# Patient Record
Sex: FEMALE | Race: WHITE | NOT HISPANIC OR LATINO | Employment: PART TIME | URBAN - METROPOLITAN AREA
[De-identification: names, ages, dates, MRNs, and addresses within clinical notes are randomized per-mention and may not be internally consistent; named-entity substitution may affect disease eponyms.]

---

## 2017-04-29 ENCOUNTER — GENERIC CONVERSION - ENCOUNTER (OUTPATIENT)
Dept: OTHER | Facility: OTHER | Age: 62
End: 2017-04-29

## 2017-08-12 ENCOUNTER — TRANSCRIBE ORDERS (OUTPATIENT)
Dept: ADMINISTRATIVE | Facility: HOSPITAL | Age: 62
End: 2017-08-12

## 2017-08-12 ENCOUNTER — APPOINTMENT (OUTPATIENT)
Dept: LAB | Facility: MEDICAL CENTER | Age: 62
End: 2017-08-12
Payer: COMMERCIAL

## 2017-08-12 DIAGNOSIS — Z00.8 HEALTH EXAMINATION IN POPULATION SURVEY: Primary | ICD-10-CM

## 2017-08-12 DIAGNOSIS — Z00.8 HEALTH EXAMINATION IN POPULATION SURVEY: ICD-10-CM

## 2017-08-12 LAB
CHOLEST SERPL-MCNC: 201 MG/DL (ref 50–200)
EST. AVERAGE GLUCOSE BLD GHB EST-MCNC: 103 MG/DL
HBA1C MFR BLD: 5.2 % (ref 4.2–6.3)
HDLC SERPL-MCNC: 49 MG/DL (ref 40–60)
LDLC SERPL CALC-MCNC: 121 MG/DL (ref 0–100)
TRIGL SERPL-MCNC: 156 MG/DL

## 2017-08-12 PROCEDURE — 36415 COLL VENOUS BLD VENIPUNCTURE: CPT

## 2017-08-12 PROCEDURE — 83036 HEMOGLOBIN GLYCOSYLATED A1C: CPT

## 2017-08-12 PROCEDURE — 80061 LIPID PANEL: CPT

## 2018-01-15 NOTE — PROGRESS NOTES
Assessment    1  Seasonal allergies (477 9) (J30 2)    Plan  Seasonal allergies    · Fluticasone Propionate 50 MCG/ACT Nasal Suspension (Flonase Allergy Relief);  USE 1 SPRAY IN EACH NOSTRIL ONCE DAILY  Not to be used  for longer than one week   · Follow-up PRN Evaluation and Treatment  Follow-up  Status: Complete  Done:  29Apr2017   · Avoid exposure to cigarette smoke ; Status:Complete;   Done: [id number]   · Avoid exposure to things that make your problem worse ; Status:Complete;   Done:  56YKR4022   · Drink at least 6 glasses of water or juice a day ; Status:Complete;   Done: 50PMF1143   · Good hand washing is one of the best ways to control the spread of germs ;  Status:Complete;   Done: 75Xka2168   · How to use a nasal spray:; Status:Complete;   Done: 29Apr2017   · Taking a hot steamy shower may help your condition ; Status:Complete;   Done:  95TYU3760   · Use a cool mist humidifier in the room ; Status:Complete;   Done: 13CPU0879    Discussion/Summary    I have explained to the patient that her symptoms are consistent w/ seasonal allergies which are causing PND which is resulting in the dry cough and hoarse voice  I have explained to her that based on her current clinical presentation I do not think an antibiotic is warranted  I have advised drinking plenty of fluids, taking Tylenol or Motrin as needed, warm salt water gargles, using Flonase nasal spray as directed, throat lozenges, and taking an OTC anti-allergy medication as needed  I did offer a Rx for Tessalon pearls to help with the cough which patient declined because she felt it did not work for her in the past  If symptoms persist despite treatment, I recommended that she should be seen by PCP for re-check, or may present to one of our walk-in centers  Patient verbalized understanding  Possible side effects of new medications were reviewed with the patient/guardian today  The treatment plan was reviewed with the patient/guardian   The patient/guardian understands and agrees with the treatment plan     Follow Up with your Primary Care Provider in 5-7 days  If your symptoms worsen follow up at the nearest [place] [address] Emergency Room  If your symptoms worsen follow up at the nearest Emergency Room  Chief Complaint  cold symptoms      History of Present Illness  56 yo female c/o dry cough, post-nasal drip, hoarse voice, and nasal congestion x 6 days  No fever/chills  No sinus pain/pressure  No chest pain, SOB, or wheezing  Non-smoker  Has been taking Robitussin DM and Advil as needed  Has a hx of Hypertrophic cardiomyopathy, is on Metoprolol, and Hyperlipidemia, for which she is on Pravastatin  Denies any known medication allergies  Patient stating she thinks she needs an antibiotic  Review of Systems    Constitutional: No fever, no chills, feels well, no tiredness, no recent weight gain or loss  ENT: as noted in HPI  Cardiovascular: no complaints of slow or fast heart rate, no chest pain, no palpitations, no leg claudication or lower extremity edema, no chest pain and no palpitations  Respiratory: cough, but as noted in HPI, no shortness of breath and no wheezing  Gastrointestinal: no complaints of abdominal pain, no constipation, no nausea or diarrhea, no vomiting, no bloody stools  Musculoskeletal: no complaints of arthralgia, no myalgia, no joint swelling or stiffness, no limb pain or swelling  Integumentary: no complaints of skin rash or lesion, no itching or dry skin, no skin wounds  Neurological: no complaints of headache, no confusion, no numbness or tingling, no dizziness or fainting  Active Problems    1  Seasonal allergies (477 9) (J30 2)    Past Medical History    The active problems and past medical history were reviewed and updated today  Family History    The family history was reviewed and updated today  Social History  The social history was reviewed and updated today        Surgical History    The surgical history was reviewed and updated today  Current Meds    The medication list was reviewed and updated today  Allergies    1   No Known Drug Allergies    Observations Made  Patient is AAOx3 in NAD      Signatures   Electronically signed by : BALJIT Mayfield ; Apr 29 2017  7:18PM EST                       (Author)

## 2018-02-12 ENCOUNTER — TRANSCRIBE ORDERS (OUTPATIENT)
Dept: ADMINISTRATIVE | Facility: HOSPITAL | Age: 63
End: 2018-02-12

## 2018-02-12 ENCOUNTER — APPOINTMENT (OUTPATIENT)
Dept: RADIOLOGY | Facility: MEDICAL CENTER | Age: 63
End: 2018-02-12
Payer: COMMERCIAL

## 2018-02-12 DIAGNOSIS — M79.672 LEFT FOOT PAIN: ICD-10-CM

## 2018-02-12 DIAGNOSIS — M79.672 LEFT FOOT PAIN: Primary | ICD-10-CM

## 2018-02-12 PROCEDURE — 73630 X-RAY EXAM OF FOOT: CPT

## 2019-04-20 ENCOUNTER — OFFICE VISIT (OUTPATIENT)
Dept: URGENT CARE | Facility: CLINIC | Age: 64
End: 2019-04-20
Payer: COMMERCIAL

## 2019-04-20 VITALS
TEMPERATURE: 100.2 F | OXYGEN SATURATION: 95 % | BODY MASS INDEX: 22.28 KG/M2 | HEIGHT: 68 IN | HEART RATE: 87 BPM | RESPIRATION RATE: 18 BRPM | SYSTOLIC BLOOD PRESSURE: 123 MMHG | WEIGHT: 147 LBS | DIASTOLIC BLOOD PRESSURE: 70 MMHG

## 2019-04-20 DIAGNOSIS — J20.9 ACUTE BRONCHITIS, UNSPECIFIED ORGANISM: Primary | ICD-10-CM

## 2019-04-20 PROCEDURE — 99283 EMERGENCY DEPT VISIT LOW MDM: CPT | Performed by: NURSE PRACTITIONER

## 2019-04-20 PROCEDURE — G0382 LEV 3 HOSP TYPE B ED VISIT: HCPCS | Performed by: NURSE PRACTITIONER

## 2019-04-20 RX ORDER — ALBUTEROL SULFATE 90 UG/1
2 AEROSOL, METERED RESPIRATORY (INHALATION) EVERY 6 HOURS PRN
Qty: 18 G | Refills: 0 | Status: SHIPPED | OUTPATIENT
Start: 2019-04-20

## 2019-04-20 RX ORDER — PRAVASTATIN SODIUM 20 MG
20 TABLET ORAL
COMMUNITY
Start: 2018-03-22

## 2019-04-20 RX ORDER — METOPROLOL SUCCINATE 25 MG/1
12.5 TABLET, EXTENDED RELEASE ORAL
COMMUNITY
Start: 2018-02-26

## 2019-04-20 RX ORDER — DEXTROMETHORPHAN HYDROBROMIDE AND PROMETHAZINE HYDROCHLORIDE 15; 6.25 MG/5ML; MG/5ML
5 SYRUP ORAL 4 TIMES DAILY PRN
Qty: 118 ML | Refills: 0 | Status: SHIPPED | OUTPATIENT
Start: 2019-04-20

## 2019-04-20 RX ORDER — AZITHROMYCIN 250 MG/1
TABLET, FILM COATED ORAL
Qty: 6 TABLET | Refills: 0 | Status: SHIPPED | OUTPATIENT
Start: 2019-04-20 | End: 2019-04-24

## 2020-12-23 ENCOUNTER — IMMUNIZATIONS (OUTPATIENT)
Dept: FAMILY MEDICINE CLINIC | Facility: HOSPITAL | Age: 65
End: 2020-12-23
Payer: COMMERCIAL

## 2020-12-23 DIAGNOSIS — Z23 ENCOUNTER FOR IMMUNIZATION: ICD-10-CM

## 2020-12-23 PROCEDURE — 91301 SARS-COV-2 / COVID-19 MRNA VACCINE (MODERNA) 100 MCG: CPT

## 2020-12-23 PROCEDURE — 0011A SARS-COV-2 / COVID-19 MRNA VACCINE (MODERNA) 100 MCG: CPT

## 2021-01-19 ENCOUNTER — IMMUNIZATIONS (OUTPATIENT)
Dept: FAMILY MEDICINE CLINIC | Facility: HOSPITAL | Age: 66
End: 2021-01-19

## 2021-01-19 DIAGNOSIS — Z23 ENCOUNTER FOR IMMUNIZATION: Primary | ICD-10-CM

## 2021-01-19 PROCEDURE — 0012A SARS-COV-2 / COVID-19 MRNA VACCINE (MODERNA) 100 MCG: CPT

## 2021-01-19 PROCEDURE — 91301 SARS-COV-2 / COVID-19 MRNA VACCINE (MODERNA) 100 MCG: CPT

## 2021-07-19 ENCOUNTER — OFFICE VISIT (OUTPATIENT)
Dept: DERMATOLOGY | Facility: CLINIC | Age: 66
End: 2021-07-19
Payer: COMMERCIAL

## 2021-07-19 VITALS — TEMPERATURE: 97.8 F | WEIGHT: 134 LBS | HEIGHT: 68 IN | BODY MASS INDEX: 20.31 KG/M2

## 2021-07-19 DIAGNOSIS — L56.8 ACTINIC CHEILITIS: Primary | ICD-10-CM

## 2021-07-19 DIAGNOSIS — L82.1 SEBORRHEIC KERATOSIS: ICD-10-CM

## 2021-07-19 PROCEDURE — 99204 OFFICE O/P NEW MOD 45 MIN: CPT | Performed by: DERMATOLOGY

## 2021-07-19 RX ORDER — FLUOROURACIL 50 MG/G
CREAM TOPICAL
Qty: 40 G | Refills: 1 | Status: SHIPPED | OUTPATIENT
Start: 2021-07-19

## 2021-07-19 NOTE — PATIENT INSTRUCTIONS
1  ACTINIC KERATOSIS    Physical Exam:   Anatomic Location Affected:  Left lower lip    Assessment and Plan:  Based on a thorough discussion of this condition and the management approach to it (including a comprehensive discussion of the known risks, side effects and potential benefits of treatment), the patient (family) agrees to implement the following specific plan:   Begin fluorouracil cream twice a day for 2 weeks in the fall   Follow up 1 month after treatment       2  SEBORRHEIC KERATOSIS; NON-INFLAMED     Physical Exam:  · Anatomic Location Affected:  Trunk, right waistline and extremities       Assessment and Plan:  Based on a thorough discussion of this condition and the management approach to it (including a comprehensive discussion of the known risks, side effects and potential benefits of treatment), the patient (family) agrees to implement the following specific plan:  · Reassure benign  · Use sun protection  Apply SPF 30 or higher at least three times a day  Wear sun protecting clothing and hats    · $150 to treat with LN2 up to 10 lesions if not bothersome

## 2021-07-19 NOTE — PROGRESS NOTES
[facility] Note     Patient Name: Chun Emerson  Encounter Date: 07/19/2021     Have you been cared for by a [facility]ologist in the last 3 years and, if so, which one? No    · Have you traveled outside of the [address] [place] [address] in the past 3 months or outside of the [facility] area in the last 2 weeks? No     May we call your Preferred Phone number to discuss your specific medical information? Yes     May we leave a detailed message that includes your specific medical information? Yes      Today's Chief Concerns:   Concern #1:  Lesion on lip   Concern #2:  Lesion left shoulder, back, left flank and right hip    Past Medical History:  Have you personally ever had or currently have any of the following? · Skin cancer (such as Melanoma, Basal Cell Carcinoma, Squamous Cell Carcinoma? (If Yes, please provide more detail)- YES, BCC right lower eyelid about 5 years ago  · Eczema: No  · Psoriasis: No  · HIV/AIDS: No  · Hepatitis B or C: No  · Tuberculosis: No  · Systemic Immunosuppression such as Diabetes, Biologic or Immunotherapy, Chemotherapy, Organ Transplantation, Bone Marrow Transplantation (If YES, please provide more detail): No  · Radiation Treatment (If YES, please provide more detail): No  · Any other major medical conditions/concerns? 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 Family History:  Have any of your "first degree relatives" (parent, brother, sister, or child) had any of the following       · Skin cancer such as Melanoma or Merkel Cell Carcinoma or Pancreatic Cancer? YES, Parents both had BCCs  · Eczema, Asthma, Hay Fever or Seasonal Allergies: YES, daughter had asthma as a child, daughter and mother had seasonal allergies  · Psoriasis or Psoriatic Arthritis: No  · Do any other medical conditions seem to run in your family?   If Yes, what condition and which relatives? No    Current Medications:   (please update all dermatological medications before printing patient's AVS!)      Current Outpatient Medications:     Coenzyme Q10 400 MG CAPS, Take by mouth, Disp: , Rfl:     metoprolol succinate (TOPROL-XL) 25 mg 24 hr tablet, Take 12 5 mg by mouth, Disp: , Rfl:     pravastatin (PRAVACHOL) 20 mg tablet, Take 20 mg by mouth, Disp: , Rfl:     albuterol (VENTOLIN HFA) 90 mcg/act inhaler, Inhale 2 puffs every 6 (six) hours as needed for wheezing (Patient not taking: Reported on 7/19/2021), Disp: 18 g, Rfl: 0    ASPIRIN 81 PO, Take 81 mg by mouth (Patient not taking: Reported on 7/19/2021), Disp: , Rfl:     promethazine-dextromethorphan (PHENERGAN-DM) 6 25-15 mg/5 mL oral syrup, Take 5 mL by mouth 4 (four) times a day as needed for cough (Patient not taking: Reported on 7/19/2021), Disp: 118 mL, Rfl: 0      Review of Systems:  Have you recently had or currently have any of the following? If YES, what are you doing for the problem? · Fever, chills or unintended weight loss: No  · Sudden loss or change in your vision: No  · Nausea, vomiting or blood in your stool: No  · Painful or swollen joints: No  · Wheezing or cough: No  · Changing mole or non-healing wound: No  · Nosebleeds: No  · Excessive sweating: No  · Easy or prolonged bleeding? No  · Over the last 2 weeks, how often have you been bothered by the following problems? · Taking little interest or pleasure in doing things: 1 - Not at All  · Feeling down, depressed, or hopeless: 1 - Not at All  · Rapid heartbeat with epinephrine:  No    · FEMALES ONLY:    · Are you pregnant or planning to become pregnant? No  · Are you currently or planning to be nursing or breast feeding? No    · Any known allergies?       · No Known Allergies      Physical Exam:     Was a chaperone (Derm Clinical Assistant) present throughout the entire Physical Exam? Yes     Did the Dermatology Team specifically  the patient on the importance of a Full Skin Exam to be sure that nothing is missed clinically? Yes}  o Did the patient ultimately request or accept a Full Skin Exam?  NO  o Did the patient specifically refuse to have the areas "under-the-bra" examined by the Dermatologist? Penelope   o Did the patient specifically refuse to have the areas "under-the-underwear" examined by the Dermatologist? YES    CONSTITUTIONAL:   Vitals:    07/19/21 1242   Temp: 97 8 °F (36 6 °C)   TempSrc: Tympanic   Weight: 60 8 kg (134 lb)   Height: 5' 8" (1 727 m)       PSYCH: Normal mood and affect  EYES: Normal conjunctiva  ENT: Normal lips and oral mucosa  CARDIOVASCULAR: No edema  RESPIRATORY: Normal respirations  HEME/LYMPH/IMMUNO:  No regional lymphadenopathy except as noted below in "ASSESSMENT AND PLAN BY DIAGNOSIS"    SKIN:  FULL ORGAN SYSTEM EXAM    Face Normal except as noted below in Assessment                       Back/Spine Normal except as noted below in Assessment       Right Leg Normal except as noted below in Assessment            Assessment and Plan by Diagnosis:    History of Present Condition:     Duration:  How long has this been an issue for you?    o  1 year   Location Affected:  Where on the body is this affecting you?    o  Lower lip   Quality:  Is there any bleeding, pain, itch, burning/irritation, or redness associated with the skin lesion?    o  Dry and flaky   Severity:  Describe any bleeding, pain, itch, burning/irritation, or redness on a scale of 1 to 10 (with 10 being the worst)  o  5   Timing:  Does this condition seem to be there pretty constantly or do you notice it more at specific times throughout the day?    o  Constantly   Context:  Have you ever noticed that this condition seems to be associated with specific activities you do?    o  Denies   Modifying Factors:    o Anything that seems to make the condition worse?    -  Denies  o What have you tried to do to make the condition better? -  Denies       1  ACTINIC cheilitis    Physical Exam:   Anatomic Location Affected:  Left lower lip   Morphological Description:  Diffuse thin pink papules and plaques extends on the cutaneous lip      Assessment and Plan:  Based on a thorough discussion of this condition and the management approach to it (including a comprehensive discussion of the known risks, side effects and potential benefits of treatment), the patient (family) agrees to implement the following specific plan:   Begin fluorouracil cream twice a day for 2 weeks in the fall   Follow up 1 month after treatment  EFUDEX INSTRUCTIONS    Efudex (5-flurouracil) is a medicine that treats the pre-cancers    1  Apply a thin layer of the medicine with your fingertip and massage it in well  Afterward, wash your finger thoroughly with water  a  Can also use gloves  2  Moisturize with Vaseline or Aquaphor after the course is complete   3  Avoid harsh soaps  Use dove unscented  4  Use ice packs for up 15 min at a time to help with itch, pain, redness  5  Do not sunbathe while using 5-FU  The sun tends to intensify your skin's reaction to this medication  You want to avoid this  6  The treated areas need to be completely protected from the sun during the treatment and the recovery process  If you need to go out into the sun, cover the area with a hat, long sleeve shirts, gloves, etc   7  Once your skin is completely healed, we recommend using a sunscreen every day with an SPF of 30 or higher (rain or shine) to prevent further skin damage  8  You may use makeup  What to Expect During Therapy   Redness is caused by inflammation and destruction of the abnormal cells and indicates your lesions are responding to the treatment  It takes 2-4 weeks for the redness to fade after stopping the medicine; most of the redness resolves over the first 1-2 weeks   Crusting and peeling occurs as the sun-damaged skin is removed to allow for replacement by normal skin      If having extensive burning, itching, swelling or tenderness call the Dermatology Department to arrange an appointment  Although the medicine sometimes produces dramatic redness, crusting and peeling, problems such as allergic reactions and infection are rare  General Information   This is a chemotherapy medication when taken by mouth; however, when applied topically, the medication is not absorbed into the bloodstream and does not cause typical chemotherapy side effects  It is safe to your body   The medicine works by destroying the pre-cancer skin cells  As a result, it causes redness, irritation, and scabbing  This is a normal part of therapeutic skin response  2  SEBORRHEIC KERATOSIS; NON-INFLAMED     Physical Exam:  · Anatomic Location Affected:  Trunk, right waistline and extremities  · Morphological Description:  Waxy, smooth to warty textured, yellow to brownish-grey to dark brown to blackish, discrete, "stuck-on" appearing papules  · Present for years  Denies pain, itch, bleeding  Additional History of Present Condition:  Present constantly; no modifying factors which make it worse or better  No prior treatment  Assessment and Plan:  Based on a thorough discussion of this condition and the management approach to it (including a comprehensive discussion of the known risks, side effects and potential benefits of treatment), the patient (family) agrees to implement the following specific plan:  · Reassure benign  · Use sun protection  Apply SPF 30 or higher at least three times a day  Wear sun protecting clothing and hats    · $150 to treat with LN2 up to 10 lesions if not bothersome        Scribe Attestation    I,:  Sara Swanson MA am acting as a scribe while in the presence of the attending physician :       I,:  Kait Bajwa MD personally performed the services described in this documentation    as scribed in my presence :

## 2021-10-08 ENCOUNTER — NURSE TRIAGE (OUTPATIENT)
Dept: OTHER | Facility: OTHER | Age: 66
End: 2021-10-08

## 2021-10-08 DIAGNOSIS — Z11.59 SPECIAL SCREENING EXAMINATION FOR VIRAL DISEASE: Primary | ICD-10-CM

## 2021-10-08 PROCEDURE — U0005 INFEC AGEN DETEC AMPLI PROBE: HCPCS | Performed by: FAMILY MEDICINE

## 2021-10-08 PROCEDURE — U0003 INFECTIOUS AGENT DETECTION BY NUCLEIC ACID (DNA OR RNA); SEVERE ACUTE RESPIRATORY SYNDROME CORONAVIRUS 2 (SARS-COV-2) (CORONAVIRUS DISEASE [COVID-19]), AMPLIFIED PROBE TECHNIQUE, MAKING USE OF HIGH THROUGHPUT TECHNOLOGIES AS DESCRIBED BY CMS-2020-01-R: HCPCS | Performed by: FAMILY MEDICINE

## 2021-11-12 ENCOUNTER — IMMUNIZATIONS (OUTPATIENT)
Dept: FAMILY MEDICINE CLINIC | Facility: HOSPITAL | Age: 66
End: 2021-11-12

## 2021-11-12 DIAGNOSIS — Z23 ENCOUNTER FOR IMMUNIZATION: Primary | ICD-10-CM

## 2021-11-12 PROCEDURE — 91306 COVID-19 MODERNA VACC 0.25 ML BOOSTER: CPT

## 2021-11-12 PROCEDURE — 0013A COVID-19 MODERNA VACC 0.25 ML BOOSTER: CPT

## 2021-11-18 ENCOUNTER — OFFICE VISIT (OUTPATIENT)
Dept: DERMATOLOGY | Facility: CLINIC | Age: 66
End: 2021-11-18
Payer: COMMERCIAL

## 2021-11-18 VITALS — HEIGHT: 68 IN | TEMPERATURE: 97.6 F | WEIGHT: 136.1 LBS | BODY MASS INDEX: 20.63 KG/M2

## 2021-11-18 DIAGNOSIS — L24.4 IRRITANT CONTACT DERMATITIS DUE TO DRUG IN CONTACT WITH SKIN: ICD-10-CM

## 2021-11-18 DIAGNOSIS — L82.1 SEBORRHEIC KERATOSIS: ICD-10-CM

## 2021-11-18 DIAGNOSIS — L56.8 ACTINIC CHEILITIS: Primary | ICD-10-CM

## 2021-11-18 PROCEDURE — 99213 OFFICE O/P EST LOW 20 MIN: CPT | Performed by: DERMATOLOGY

## 2022-09-20 DIAGNOSIS — M79.672 FOOT PAIN, BILATERAL: Primary | ICD-10-CM

## 2022-09-20 DIAGNOSIS — M79.671 FOOT PAIN, BILATERAL: Primary | ICD-10-CM

## 2022-09-20 RX ORDER — LIDOCAINE 4 G/G
1 PATCH TOPICAL ONCE
Qty: 5 PATCH | Refills: 2 | Status: SHIPPED | OUTPATIENT
Start: 2022-09-20 | End: 2022-09-20

## 2023-09-14 ENCOUNTER — HOSPITAL ENCOUNTER (OUTPATIENT)
Dept: CARDIOLOGY | Facility: HOSPITAL | Age: 68
Discharge: HOME | End: 2023-09-14
Attending: ORTHOPAEDIC SURGERY
Payer: COMMERCIAL

## 2023-09-14 ENCOUNTER — APPOINTMENT (OUTPATIENT)
Dept: LAB | Facility: HOSPITAL | Age: 68
End: 2023-09-14
Attending: ORTHOPAEDIC SURGERY
Payer: COMMERCIAL

## 2023-09-14 ENCOUNTER — OFFICE VISIT (OUTPATIENT)
Dept: PREADMISSION TESTING | Facility: HOSPITAL | Age: 68
End: 2023-09-14
Attending: ORTHOPAEDIC SURGERY
Payer: COMMERCIAL

## 2023-09-14 ENCOUNTER — TRANSCRIBE ORDERS (OUTPATIENT)
Dept: REGISTRATION | Facility: HOSPITAL | Age: 68
End: 2023-09-14

## 2023-09-14 VITALS
DIASTOLIC BLOOD PRESSURE: 81 MMHG | HEIGHT: 68 IN | RESPIRATION RATE: 20 BRPM | HEART RATE: 104 BPM | WEIGHT: 122.4 LBS | SYSTOLIC BLOOD PRESSURE: 136 MMHG | TEMPERATURE: 97.5 F | OXYGEN SATURATION: 100 % | BODY MASS INDEX: 18.55 KG/M2

## 2023-09-14 DIAGNOSIS — M16.11 UNILATERAL PRIMARY OSTEOARTHRITIS, RIGHT HIP: ICD-10-CM

## 2023-09-14 DIAGNOSIS — I48.0 PAROXYSMAL ATRIAL FIBRILLATION (CMS/HCC): ICD-10-CM

## 2023-09-14 DIAGNOSIS — I42.2 HYPERTROPHIC CARDIOMYOPATHY (CMS/HCC): ICD-10-CM

## 2023-09-14 DIAGNOSIS — M16.11 UNILATERAL PRIMARY OSTEOARTHRITIS, RIGHT HIP: Primary | ICD-10-CM

## 2023-09-14 DIAGNOSIS — Z01.818 PREOP TESTING: Primary | ICD-10-CM

## 2023-09-14 DIAGNOSIS — M16.11 OSTEOARTHRITIS OF RIGHT HIP, UNSPECIFIED OSTEOARTHRITIS TYPE: ICD-10-CM

## 2023-09-14 LAB
ABO + RH BLD: NORMAL
ALBUMIN SERPL-MCNC: 4.6 G/DL (ref 3.5–5.7)
ALP SERPL-CCNC: 89 IU/L (ref 34–125)
ALT SERPL-CCNC: 21 IU/L (ref 7–52)
ANION GAP SERPL CALC-SCNC: 7 MEQ/L (ref 3–15)
APTT PPP: 32 SEC (ref 23–35)
AST SERPL-CCNC: 17 IU/L (ref 13–39)
BASOPHILS # BLD: 0.03 K/UL (ref 0.01–0.1)
BASOPHILS NFR BLD: 0.6 %
BILIRUB SERPL-MCNC: 0.8 MG/DL (ref 0.3–1.2)
BLD GP AB SCN SERPL QL: NEGATIVE
BUN SERPL-MCNC: 13 MG/DL (ref 7–25)
CALCIUM SERPL-MCNC: 9.5 MG/DL (ref 8.6–10.3)
CHLORIDE SERPL-SCNC: 107 MEQ/L (ref 98–107)
CO2 SERPL-SCNC: 28 MEQ/L (ref 21–31)
CREAT SERPL-MCNC: 0.8 MG/DL (ref 0.6–1.2)
D AG BLD QL: POSITIVE
DIFFERENTIAL METHOD BLD: ABNORMAL
EOSINOPHIL # BLD: 0.06 K/UL (ref 0.04–0.36)
EOSINOPHIL NFR BLD: 1.1 %
ERYTHROCYTE [DISTWIDTH] IN BLOOD BY AUTOMATED COUNT: 14 % (ref 11.7–14.4)
EST. AVERAGE GLUCOSE BLD GHB EST-MCNC: 117 MG/DL
GFR SERPL CREATININE-BSD FRML MDRD: >60 ML/MIN/1.73M*2
GLUCOSE SERPL-MCNC: 92 MG/DL (ref 70–99)
HBA1C MFR BLD: 5.7 %
HCT VFR BLDCO AUTO: 41.7 % (ref 35–45)
HGB BLD-MCNC: 13.4 G/DL (ref 11.8–15.7)
IMM GRANULOCYTES # BLD AUTO: 0.01 K/UL (ref 0–0.08)
IMM GRANULOCYTES NFR BLD AUTO: 0.2 %
INR PPP: 1.2
LABORATORY COMMENT REPORT: NORMAL
LYMPHOCYTES # BLD: 1.52 K/UL (ref 1.2–3.5)
LYMPHOCYTES NFR BLD: 29 %
MCH RBC QN AUTO: 30.5 PG (ref 28–33.2)
MCHC RBC AUTO-ENTMCNC: 32.1 G/DL (ref 32.2–35.5)
MCV RBC AUTO: 95 FL (ref 83–98)
MONOCYTES # BLD: 0.41 K/UL (ref 0.28–0.8)
MONOCYTES NFR BLD: 7.8 %
NEUTROPHILS # BLD: 3.22 K/UL (ref 1.7–7)
NEUTS SEG NFR BLD: 61.3 %
NRBC BLD-RTO: 0 %
PDW BLD AUTO: 9.7 FL (ref 9.4–12.3)
PLATELET # BLD AUTO: 190 K/UL (ref 150–369)
POTASSIUM SERPL-SCNC: 4.5 MEQ/L (ref 3.5–5.1)
PROT SERPL-MCNC: 6.6 G/DL (ref 6–8.2)
PROTHROMBIN TIME: 14.7 SEC (ref 12.2–14.5)
QRS DURATION: 84
QT INTERVAL: 352
QTC CALCULATION(BAZETT): 476
R AXIS: 73
RBC # BLD AUTO: 4.39 M/UL (ref 3.93–5.22)
SODIUM SERPL-SCNC: 142 MEQ/L (ref 136–145)
SPECIMEN EXP DATE BLD: NORMAL
T WAVE AXIS: 253
VENTRICULAR RATE: 110
WBC # BLD AUTO: 5.25 K/UL (ref 3.8–10.5)

## 2023-09-14 PROCEDURE — 3008F BODY MASS INDEX DOCD: CPT | Performed by: HOSPITALIST

## 2023-09-14 PROCEDURE — 99205 OFFICE O/P NEW HI 60 MIN: CPT | Performed by: HOSPITALIST

## 2023-09-14 PROCEDURE — 80053 COMPREHEN METABOLIC PANEL: CPT

## 2023-09-14 PROCEDURE — 85730 THROMBOPLASTIN TIME PARTIAL: CPT

## 2023-09-14 PROCEDURE — 85610 PROTHROMBIN TIME: CPT

## 2023-09-14 PROCEDURE — 83036 HEMOGLOBIN GLYCOSYLATED A1C: CPT

## 2023-09-14 PROCEDURE — 86901 BLOOD TYPING SEROLOGIC RH(D): CPT

## 2023-09-14 PROCEDURE — 93005 ELECTROCARDIOGRAM TRACING: CPT

## 2023-09-14 PROCEDURE — 93010 ELECTROCARDIOGRAM REPORT: CPT | Performed by: INTERNAL MEDICINE

## 2023-09-14 PROCEDURE — 36415 COLL VENOUS BLD VENIPUNCTURE: CPT

## 2023-09-14 PROCEDURE — 85025 COMPLETE CBC W/AUTO DIFF WBC: CPT

## 2023-09-14 RX ORDER — ACETAMINOPHEN 500 MG
1000 TABLET ORAL EVERY 6 HOURS PRN
COMMUNITY

## 2023-09-14 RX ORDER — AMIODARONE HYDROCHLORIDE 200 MG/1
200 TABLET ORAL NIGHTLY
COMMUNITY
Start: 2023-08-31

## 2023-09-14 RX ORDER — APIXABAN 5 MG/1
5 TABLET, FILM COATED ORAL 2 TIMES DAILY
COMMUNITY
Start: 2023-08-08 | End: 2023-10-11 | Stop reason: HOSPADM

## 2023-09-14 RX ORDER — PRAVASTATIN SODIUM 20 MG/1
20 TABLET ORAL NIGHTLY
COMMUNITY
Start: 2023-09-05

## 2023-09-14 RX ORDER — METOPROLOL TARTRATE 25 MG/1
25 TABLET, FILM COATED ORAL 2 TIMES DAILY
COMMUNITY
Start: 2023-08-09

## 2023-09-14 ASSESSMENT — PAIN SCALES - GENERAL: PAINLEVEL: 2

## 2023-09-14 NOTE — ASSESSMENT & PLAN NOTE
History of HOCM with LVOT obstruction.  She reports this has improved with the use of mavacamten.  She follows closely with Dr. Lay, her cardiologist.   Ultimately defer preoperative cardiovascular risk assessment and recommendations for further testing to the patient's cardiologist. She will be seeing Dr. Lay on 10/2/23.   Recommend telemetry post op. Defer further management of afib and HOCM to the patient's cardiologist.  She will continue Mavacamten per her cardiologist recommendations.

## 2023-09-14 NOTE — CONSULTS
[facility] Medicine Service -  Pre-Operative Consultation       Patient Name: Karina Talavera  Referring Surgeon: Dr. Boeyr    Reason for Referral: Pre-Operative Evaluation  Surgical Procedure: R THR  Operative Date: 10/10/23    PCP: Becki Holt RN        HISTORY OF PRESENT ILLNESS      Karina Talavera is a 67 y.o. female presenting today to the [facility] at [place] for pre-operative evaluation prior to planned surgery.    Ms. Talavera has been dealing with pain and decreased ROM of her R hip due to DJD. The symptoms have progressed and are interfering with her quality of life so she has decided to proceed with joint replacement.    Overall, the patient has been feeling in good health. She denies current or recent CP, SOB, syncope, fever, cough, abd pain, vomiting, diarrhea, BRBPR, melena, hematemesis, dysuria, hematuria, headache, vision change, vertigo, numbness/tingling/focal weakness, light headedness, or additional complaint.       Functionally, the patient is able to ascend a flight of stairs with no dyspnea or chest pain, and can walk at least a city block.   She denies ever having any anginal symptoms.  Functional capacity is  > 4 METS.    The patient denies, on specific questioning, the following:  No history of MI, valvular heart disease or CHF.  No history of EMPERATRIZ.  No history of DVT/PE.  No history of COPD.  No history of CVA or TIA.  No history of DM or insulin use.   No history of CKD.   No history of liver disease or cirrhosis.  No history of bleeding disorder.  No history of difficult airway/difficult intubation.  No history of Malignant hyperthermia.  No history of adverse reaction to anesthesia in the past.      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, right  "       Past Surgical History:   Procedure Laterality Date    CARDIOVERSION  08/2023    DILATION AND CURETTAGE OF UTERUS         MEDICATIONS        Current Outpatient Medications:     acetaminophen (TYLENOL) 500 mg tablet, Take 1,000 mg by mouth every 6 (six) hours as needed., Disp: , Rfl:     glucosamine HCl/chondroitin lujan (GLUCOSAMINE-CHONDROITIN ORAL), Take 1 tablet by mouth 3 (three) times a day., Disp: , Rfl:     amiodarone (PACERONE) 200 mg tablet, Take 200 mg by mouth nightly., Disp: , Rfl:     CAMZYOS 10 mg capsule capsule, Take 10 mg by mouth nightly., Disp: , Rfl:     ELIQUIS 5 mg tablet, Take 5 mg by mouth 2 (two) times a day., Disp: , Rfl:     metoprolol tartrate (LOPRESSOR) 25 mg tablet, Take 25 mg by mouth 2 (two) times a day., Disp: , Rfl:     pravastatin (PRAVACHOL) 20 mg tablet, Take 20 mg by mouth nightly., Disp: , Rfl:     ALLERGIES      Patient has no known allergies.    FAMILY HISTORY        Denies pertinent family history    SOCIAL HISTORY      Social History     Tobacco Use    Smoking status: Never    Smokeless tobacco: Never   Vaping Use    Vaping Use: Never used   Substance Use Topics    Alcohol use: Yes     Comment: glass of wine 3-4 times per week    Drug use: Never       REVIEW OF SYSTEMS      All other systems reviewed and negative except as noted in HPI    PHYSICAL EXAMINATION      Visit Vitals  /81   Pulse (!) 104   Temp 36.4 °C (97.5 °F)   Resp 20   Ht 1.727 m (5' 8\")   Wt 55.5 kg (122 lb 6.4 oz)   SpO2 100%   BMI 18.61 kg/m²     Body mass index is 18.61 kg/m².    Physical Exam  General: nontoxic appearing, no acute distress  HEENT: Moist membranes, PERRL, anicteric sclera   Neck: supple, no JVD  Cardiac: RRR, +S1/S2, no murmur, no rub   Lungs: clear bilaterally, no wheezing/rales/rhonchi  Abdomen: soft, NT/ND, +BS, no rebound/guarding   Extremities: no edema, distal perfusion intact  MSK:  no joint effusion or erythema  Neuro: AAOx3, nonfocal.   Skin: no rash. " Clean, dry, intact.   Psych: cooperative    LABS / EKG        Labs  Today's labs reviewed.     Lab Results   Component Value Date     09/14/2023    K 4.5 09/14/2023     09/14/2023    BUN 13 09/14/2023    CREATININE 0.8 09/14/2023    WBC 5.25 09/14/2023    HGB 13.4 09/14/2023    HCT 41.7 09/14/2023     09/14/2023    ALT 21 09/14/2023    AST 17 09/14/2023    INR 1.2 09/14/2023    HGBA1C 5.7 (H) 09/14/2023         ECG   Reviewed. Atrial fibrillation 110 bpm. Nonspecific abnormality.    ASSESSMENT AND PLAN         Osteoarthritis of right hip  R THR planned for 10/10/23.  See below for statement in regards to perioperative risk.   Defer recommendations on NSAID use to the patient's surgeon.  Recommend DVT prophylaxis at the discretion of the surgeon.   Incentive spirometry and early ambulation post op.  Perioperative antibiotics defer to surgery.  Post op bowel regimen.  If present, recommend removal of ricci catheter as early as possible to prevent infection.  Monitor CBC, BMP, and volume status in the perioperative period.      Hypertrophic cardiomyopathy (CMS/HCC)  History of HOCM with LVOT obstruction.  She reports this has improved with the use of mavacamten.  She follows closely with Dr. Lay, her cardiologist.   Ultimately defer preoperative cardiovascular risk assessment and recommendations for further testing to the patient's cardiologist. She will be seeing Dr. Lay on 10/2/23.   Recommend telemetry post op. Defer further management of afib and HOCM to the patient's cardiologist.  She will continue Mavacamten per her cardiologist recommendations.    Paroxysmal atrial fibrillation (CMS/HCC)  The patient had a DCCM in August, but continues to have intermittent atrial fibrillation since then.  She states she does not have any significant symptoms but knows when she is in/out of atrial fibrillation.  Her cardiologist is aware of this and recently started amiodarone.  The patient will see  her cardiologist prior to the surgery for further recommendations.  She will continue metoprolol the morning of surgery.  Telemetry post op.  She will hold Eliquis 72 hours prior to surgery.   The patient is aware of the increased risk of thrombosis or stroke while her AC is on hold for surgery.  Recommend resuming Eliquis as soon as ok with the surgeon post op.         In regards to perioperative cardiac risk:  The patient denies any history of ischemic heart disease, denies any history of CHF, denies any history of CVA, is not on pre-operative treatment with insulin, and does not have a pre-operative creatinine > 2 mg/dL.   The Revised Cardiac Risk Index (RCRI) = 0 for this patient which indicates a 0.4% risk of major adverse cardiac event in the perioperative period for this intermediate risk procedure.   She does have a history of HCM with LVOT obstruction. Ultimately defer preoperative cardiovascular risk assessment and recommendations for further testing to the patient's cardiologist. She will be seeing Dr. Lay on 10/2/23. Recommend telemetry post op. Defer further management of afib and HOCM to the patient's cardiologist.      Further comments:  STOP-BANG screening for obstructive sleep apnea = 1, which indicates the patient is low risk for having undrlying EMPERATRIZ.          Please do not hesitate to contact [facility] during the upcoming hospitalization with any questions or concerns.     Jayesh Castro, DO  9/15/2023

## 2023-09-14 NOTE — ASSESSMENT & PLAN NOTE
R THR planned for 10/10/23.  See below for statement in regards to perioperative risk.   Defer recommendations on NSAID use to the patient's surgeon.  Recommend DVT prophylaxis at the discretion of the surgeon.   Incentive spirometry and early ambulation post op.  Perioperative antibiotics defer to surgery.  Post op bowel regimen.  If present, recommend removal of ricci catheter as early as possible to prevent infection.  Monitor CBC, BMP, and volume status in the perioperative period.

## 2023-09-14 NOTE — PRE-PROCEDURE INSTRUCTIONS
1. You will be called between 3pm-7pm one business day before your procedure to tell you where and when to report. If you do not receive a phone call by 7pm, please call the Admissions office at [phone].    2. Please report to Surgery Registration on the day of your procedure. You can park in the [facility], enter the front doors of the new Pavilion, and take the [facility] elevators to the second floor. Follow signs to Surgery Registration.       3. Please follow the following fasting guidelines:     No solid food EIGHT HOURS prior to arrival time on day of surgery.  Unlimited clear liquids, meaning water or PLAIN black coffee WITHOUT any milk, cream, sugar, or sweetener are permitted up to TWO HOURS prior to arrival at the hospital. Purchase a 20 oz bottle of Gatorade. Drink 10 oz the night before surgery and 10 oz two hours prior to arrival time to hospital.    4. Please take ONLY the following medications with a sip of water on the morning of your procedure:   metoprolol on am of surgery. Hold supplements for one week. Stop Eliquis 72 hours prior to surgery per Dr Castro.    5. Other Instructions: You may brush your teeth the morning of the procedure. Rinse and spit, do not swallow.  Bring a list of your medications with dosages.  Use surgical wash as directed.     6. If you develop a cold, cough, fever, rash, or other symptom prior to the day of the procedure, please report it to your physician immediately.    7. If you need to cancel the procedure for any reason, please contact your physician.    8. Make arrangements to have safe transportation home accompanied by a responsible adult. If you have not arranged safe transportation home, your surgery will be cancelled. Safe transportation may include private vehicle, ride-share service, taxi and public transportation when accompanied by a responsible adult who will assist you home. A responsible adult is someone known to you and does not include the taxi,  ride-share or public transit drive transporting you.    9.  If it is medically necessary for you to have a longer stay, you will be informed as soon as the decision is made.    10. Only bring essential items to the hospital.  Do not wear or bring anything of value to the hospital including jewelry of any kind, money, or wallet. Do not wear make-up or contact lenses.  DO NOT BRING MEDICATIONS FROM HOME unless instructed to do so. DO bring your hearing aids, glasses, and a case    11. No lotion, creams, powders, or oils on skin the morning of procedure     12. Dress in comfortable clothes.    13.  If instructed, please bring a copy of your Advanced Directive (Living Will/Durable Power of ) on the day of your procedure.     14. Ensuring your safety at all times is a very important part of our [facility] Culture of Safety. After having surgery and sedation, you are at risk for falling and balance issues. Although you may feel awake, the effects of the medication can last up to 24 hours after anesthesia. If you need to use the bathroom during your recovery period, nursing staff will escort you there and stay with you to ensure your safety.    15. Refrain from drinking alcohol and smoking cigarettes for 24 hours prior to surgery.    16. Shower with antibacterial soap (Dial) the night before and morning of your procedure.  If your procedure indicates the need for CHG antiseptic wash (Bactoshield or Hibiclens), please use this instead and follow instructions as discussed at the time of your Pre-Admission Testing phone interview or visit.    Above instructions reviewed with patient and patient acknowledges understanding.    Form explained by: Becki Pritchard RN

## 2023-09-14 NOTE — ASSESSMENT & PLAN NOTE
The patient had a DCCM in August, but continues to have intermittent atrial fibrillation since then.  She states she does not have any significant symptoms but knows when she is in/out of atrial fibrillation.  Her cardiologist is aware of this and recently started amiodarone.  The patient will see her cardiologist prior to the surgery for further recommendations.  She will continue metoprolol the morning of surgery.  Telemetry post op.  She will hold Eliquis 72 hours prior to surgery.   The patient is aware of the increased risk of thrombosis or stroke while her AC is on hold for surgery.  Recommend resuming Eliquis as soon as ok with the surgeon post op.

## 2023-09-14 NOTE — H&P (VIEW-ONLY)
[facility] Medicine Service -  Pre-Operative Consultation       Patient Name: Karina Talavera  Referring Surgeon: Dr. Boyer    Reason for Referral: Pre-Operative Evaluation  Surgical Procedure: R THR  Operative Date: 10/10/23    PCP: Becki Holt RN        HISTORY OF PRESENT ILLNESS      Karina Talavera is a 67 y.o. female presenting today to the [facility] at [place] for pre-operative evaluation prior to planned surgery.    Ms. Talavera has been dealing with pain and decreased ROM of her R hip due to DJD. The symptoms have progressed and are interfering with her quality of life so she has decided to proceed with joint replacement.    Overall, the patient has been feeling in good health. She denies current or recent CP, SOB, syncope, fever, cough, abd pain, vomiting, diarrhea, BRBPR, melena, hematemesis, dysuria, hematuria, headache, vision change, vertigo, numbness/tingling/focal weakness, light headedness, or additional complaint.       Functionally, the patient is able to ascend a flight of stairs with no dyspnea or chest pain, and can walk at least a city block.   She denies ever having any anginal symptoms.  Functional capacity is  > 4 METS.    The patient denies, on specific questioning, the following:  No history of MI, valvular heart disease or CHF.  No history of EMPERATRIZ.  No history of DVT/PE.  No history of COPD.  No history of CVA or TIA.  No history of DM or insulin use.   No history of CKD.   No history of liver disease or cirrhosis.  No history of bleeding disorder.  No history of difficult airway/difficult intubation.  No history of Malignant hyperthermia.  No history of adverse reaction to anesthesia in the past.      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, right  "       Past Surgical History:   Procedure Laterality Date    CARDIOVERSION  08/2023    DILATION AND CURETTAGE OF UTERUS         MEDICATIONS        Current Outpatient Medications:     acetaminophen (TYLENOL) 500 mg tablet, Take 1,000 mg by mouth every 6 (six) hours as needed., Disp: , Rfl:     glucosamine HCl/chondroitin lujan (GLUCOSAMINE-CHONDROITIN ORAL), Take 1 tablet by mouth 3 (three) times a day., Disp: , Rfl:     amiodarone (PACERONE) 200 mg tablet, Take 200 mg by mouth nightly., Disp: , Rfl:     CAMZYOS 10 mg capsule capsule, Take 10 mg by mouth nightly., Disp: , Rfl:     ELIQUIS 5 mg tablet, Take 5 mg by mouth 2 (two) times a day., Disp: , Rfl:     metoprolol tartrate (LOPRESSOR) 25 mg tablet, Take 25 mg by mouth 2 (two) times a day., Disp: , Rfl:     pravastatin (PRAVACHOL) 20 mg tablet, Take 20 mg by mouth nightly., Disp: , Rfl:     ALLERGIES      Patient has no known allergies.    FAMILY HISTORY        Denies pertinent family history    SOCIAL HISTORY      Social History     Tobacco Use    Smoking status: Never    Smokeless tobacco: Never   Vaping Use    Vaping Use: Never used   Substance Use Topics    Alcohol use: Yes     Comment: glass of wine 3-4 times per week    Drug use: Never       REVIEW OF SYSTEMS      All other systems reviewed and negative except as noted in HPI    PHYSICAL EXAMINATION      Visit Vitals  /81   Pulse (!) 104   Temp 36.4 °C (97.5 °F)   Resp 20   Ht 1.727 m (5' 8\")   Wt 55.5 kg (122 lb 6.4 oz)   SpO2 100%   BMI 18.61 kg/m²     Body mass index is 18.61 kg/m².    Physical Exam  General: nontoxic appearing, no acute distress  HEENT: Moist membranes, PERRL, anicteric sclera   Neck: supple, no JVD  Cardiac: RRR, +S1/S2, no murmur, no rub   Lungs: clear bilaterally, no wheezing/rales/rhonchi  Abdomen: soft, NT/ND, +BS, no rebound/guarding   Extremities: no edema, distal perfusion intact  MSK:  no joint effusion or erythema  Neuro: AAOx3, nonfocal.   Skin: no rash. " Clean, dry, intact.   Psych: cooperative    LABS / EKG        Labs  Today's labs reviewed.     Lab Results   Component Value Date     09/14/2023    K 4.5 09/14/2023     09/14/2023    BUN 13 09/14/2023    CREATININE 0.8 09/14/2023    WBC 5.25 09/14/2023    HGB 13.4 09/14/2023    HCT 41.7 09/14/2023     09/14/2023    ALT 21 09/14/2023    AST 17 09/14/2023    INR 1.2 09/14/2023    HGBA1C 5.7 (H) 09/14/2023         ECG   Reviewed. Atrial fibrillation 110 bpm. Nonspecific abnormality.    ASSESSMENT AND PLAN         Osteoarthritis of right hip  R THR planned for 10/10/23.  See below for statement in regards to perioperative risk.   Defer recommendations on NSAID use to the patient's surgeon.  Recommend DVT prophylaxis at the discretion of the surgeon.   Incentive spirometry and early ambulation post op.  Perioperative antibiotics defer to surgery.  Post op bowel regimen.  If present, recommend removal of ricci catheter as early as possible to prevent infection.  Monitor CBC, BMP, and volume status in the perioperative period.      Hypertrophic cardiomyopathy (CMS/HCC)  History of HOCM with LVOT obstruction.  She reports this has improved with the use of mavacamten.  She follows closely with Dr. Lay, her cardiologist.   Ultimately defer preoperative cardiovascular risk assessment and recommendations for further testing to the patient's cardiologist. She will be seeing Dr. Lay on 10/2/23.   Recommend telemetry post op. Defer further management of afib and HOCM to the patient's cardiologist.  She will continue Mavacamten per her cardiologist recommendations.    Paroxysmal atrial fibrillation (CMS/HCC)  The patient had a DCCM in August, but continues to have intermittent atrial fibrillation since then.  She states she does not have any significant symptoms but knows when she is in/out of atrial fibrillation.  Her cardiologist is aware of this and recently started amiodarone.  The patient will see  her cardiologist prior to the surgery for further recommendations.  She will continue metoprolol the morning of surgery.  Telemetry post op.  She will hold Eliquis 72 hours prior to surgery.   The patient is aware of the increased risk of thrombosis or stroke while her AC is on hold for surgery.  Recommend resuming Eliquis as soon as ok with the surgeon post op.         In regards to perioperative cardiac risk:  The patient denies any history of ischemic heart disease, denies any history of CHF, denies any history of CVA, is not on pre-operative treatment with insulin, and does not have a pre-operative creatinine > 2 mg/dL.   The Revised Cardiac Risk Index (RCRI) = 0 for this patient which indicates a 0.4% risk of major adverse cardiac event in the perioperative period for this intermediate risk procedure.   She does have a history of HCM with LVOT obstruction. Ultimately defer preoperative cardiovascular risk assessment and recommendations for further testing to the patient's cardiologist. She will be seeing Dr. Lay on 10/2/23. Recommend telemetry post op. Defer further management of afib and HOCM to the patient's cardiologist.      Further comments:  STOP-BANG screening for obstructive sleep apnea = 1, which indicates the patient is low risk for having undrlying EMPERATRIZ.          Please do not hesitate to contact [facility] during the upcoming hospitalization with any questions or concerns.     Jayesh Castro, DO  9/15/2023

## 2023-10-09 ENCOUNTER — ANESTHESIA EVENT (OUTPATIENT)
Dept: OPERATING ROOM | Facility: HOSPITAL | Age: 68
Setting detail: HOSPITAL OUTPATIENT SURGERY
End: 2023-10-09
Payer: COMMERCIAL

## 2023-10-10 ENCOUNTER — APPOINTMENT (OUTPATIENT)
Dept: RADIOLOGY | Facility: HOSPITAL | Age: 68
End: 2023-10-10
Attending: NURSE PRACTITIONER
Payer: COMMERCIAL

## 2023-10-10 ENCOUNTER — HOSPITAL ENCOUNTER (OUTPATIENT)
Facility: HOSPITAL | Age: 68
Discharge: HOME | End: 2023-10-11
Attending: ORTHOPAEDIC SURGERY | Admitting: ORTHOPAEDIC SURGERY
Payer: COMMERCIAL

## 2023-10-10 ENCOUNTER — ANESTHESIA (OUTPATIENT)
Dept: OPERATING ROOM | Facility: HOSPITAL | Age: 68
Setting detail: HOSPITAL OUTPATIENT SURGERY
End: 2023-10-10
Payer: COMMERCIAL

## 2023-10-10 PROBLEM — E78.5 DYSLIPIDEMIA: Status: ACTIVE | Noted: 2023-10-10

## 2023-10-10 PROBLEM — I48.91 ATRIAL FIBRILLATION (CMS/HCC): Status: ACTIVE | Noted: 2023-09-14

## 2023-10-10 LAB
ABO + RH BLD: NORMAL
BASOPHILS # BLD: 0.03 K/UL (ref 0.01–0.1)
BASOPHILS NFR BLD: 0.2 %
BLD GP AB SCN SERPL QL: NEGATIVE
D AG BLD QL: POSITIVE
DIFFERENTIAL METHOD BLD: ABNORMAL
EOSINOPHIL # BLD: 0.01 K/UL (ref 0.04–0.36)
EOSINOPHIL NFR BLD: 0.1 %
ERYTHROCYTE [DISTWIDTH] IN BLOOD BY AUTOMATED COUNT: 14.1 % (ref 11.7–14.4)
HCT VFR BLDCO AUTO: 28.5 % (ref 35–45)
HCT VFR BLDCO AUTO: 31 % (ref 35–45)
HGB BLD-MCNC: 9.3 G/DL (ref 11.8–15.7)
HGB BLD-MCNC: 9.8 G/DL (ref 11.8–15.7)
IMM GRANULOCYTES # BLD AUTO: 0.04 K/UL (ref 0–0.08)
IMM GRANULOCYTES NFR BLD AUTO: 0.3 %
INR PPP: 1.1
LABORATORY COMMENT REPORT: NORMAL
LYMPHOCYTES # BLD: 0.7 K/UL (ref 1.2–3.5)
LYMPHOCYTES NFR BLD: 5.5 %
MCH RBC QN AUTO: 30.7 PG (ref 28–33.2)
MCHC RBC AUTO-ENTMCNC: 31.6 G/DL (ref 32.2–35.5)
MCV RBC AUTO: 97.2 FL (ref 83–98)
MONOCYTES # BLD: 0.27 K/UL (ref 0.28–0.8)
MONOCYTES NFR BLD: 2.1 %
NEUTROPHILS # BLD: 11.65 K/UL (ref 1.7–7)
NEUTS SEG NFR BLD: 91.8 %
NRBC BLD-RTO: 0 %
PDW BLD AUTO: 9.9 FL (ref 9.4–12.3)
PLATELET # BLD AUTO: 168 K/UL (ref 150–369)
PROTHROMBIN TIME: 13.7 SEC (ref 12.2–14.5)
RBC # BLD AUTO: 3.19 M/UL (ref 3.93–5.22)
SPECIMEN EXP DATE BLD: NORMAL
WBC # BLD AUTO: 12.7 K/UL (ref 3.8–10.5)

## 2023-10-10 PROCEDURE — 63600000 HC DRUGS/DETAIL CODE: Mod: JZ | Performed by: NURSE PRACTITIONER

## 2023-10-10 PROCEDURE — 63700000 HC SELF-ADMINISTRABLE DRUG: Performed by: NURSE PRACTITIONER

## 2023-10-10 PROCEDURE — 97162 PT EVAL MOD COMPLEX 30 MIN: CPT | Mod: GP

## 2023-10-10 PROCEDURE — 99202 OFFICE O/P NEW SF 15 MIN: CPT | Performed by: HOSPITALIST

## 2023-10-10 PROCEDURE — C1713 ANCHOR/SCREW BN/BN,TIS/BN: HCPCS | Performed by: ORTHOPAEDIC SURGERY

## 2023-10-10 PROCEDURE — 85018 HEMOGLOBIN: CPT | Mod: 59 | Performed by: NURSE PRACTITIONER

## 2023-10-10 PROCEDURE — 85014 HEMATOCRIT: CPT | Performed by: NURSE PRACTITIONER

## 2023-10-10 PROCEDURE — 97530 THERAPEUTIC ACTIVITIES: CPT | Mod: GP

## 2023-10-10 PROCEDURE — 25000000 HC PHARMACY GENERAL: Mod: JW | Performed by: NURSE ANESTHETIST, CERTIFIED REGISTERED

## 2023-10-10 PROCEDURE — 25000000 HC PHARMACY GENERAL: Performed by: ORTHOPAEDIC SURGERY

## 2023-10-10 PROCEDURE — 73501 X-RAY EXAM HIP UNI 1 VIEW: CPT | Mod: RT

## 2023-10-10 PROCEDURE — 85610 PROTHROMBIN TIME: CPT | Performed by: ANESTHESIOLOGY

## 2023-10-10 PROCEDURE — 36415 COLL VENOUS BLD VENIPUNCTURE: CPT | Performed by: ORTHOPAEDIC SURGERY

## 2023-10-10 PROCEDURE — 36000005 HC OR LEVEL 5 INITIAL 30MIN: Performed by: ORTHOPAEDIC SURGERY

## 2023-10-10 PROCEDURE — 25800000 HC PHARMACY IV SOLUTIONS: Performed by: NURSE PRACTITIONER

## 2023-10-10 PROCEDURE — 27200000 HC STERILE SUPPLY: Performed by: ORTHOPAEDIC SURGERY

## 2023-10-10 PROCEDURE — 63600000 HC DRUGS/DETAIL CODE: Performed by: NURSE ANESTHETIST, CERTIFIED REGISTERED

## 2023-10-10 PROCEDURE — 71000011 HC PACU PHASE 1 EA ADDL MIN: Performed by: ORTHOPAEDIC SURGERY

## 2023-10-10 PROCEDURE — 63600000 HC DRUGS/DETAIL CODE: Mod: JZ | Performed by: ORTHOPAEDIC SURGERY

## 2023-10-10 PROCEDURE — 63600000 HC DRUGS/DETAIL CODE: Performed by: ANESTHESIOLOGY

## 2023-10-10 PROCEDURE — C1776 JOINT DEVICE (IMPLANTABLE): HCPCS | Performed by: ORTHOPAEDIC SURGERY

## 2023-10-10 PROCEDURE — 25000000 HC PHARMACY GENERAL: Performed by: NURSE PRACTITIONER

## 2023-10-10 PROCEDURE — 85025 COMPLETE CBC W/AUTO DIFF WBC: CPT | Performed by: STUDENT IN AN ORGANIZED HEALTH CARE EDUCATION/TRAINING PROGRAM

## 2023-10-10 PROCEDURE — 36000015 HC OR LEVEL 5 EA ADDL MIN: Performed by: ORTHOPAEDIC SURGERY

## 2023-10-10 PROCEDURE — 37000010 HC ANESTHESIA SPINAL: Performed by: ORTHOPAEDIC SURGERY

## 2023-10-10 PROCEDURE — 97116 GAIT TRAINING THERAPY: CPT | Mod: GP

## 2023-10-10 PROCEDURE — 0SR90JA REPLACEMENT OF RIGHT HIP JOINT WITH SYNTHETIC SUBSTITUTE, UNCEMENTED, OPEN APPROACH: ICD-10-PCS | Performed by: ORTHOPAEDIC SURGERY

## 2023-10-10 PROCEDURE — 25000000 HC PHARMACY GENERAL: Performed by: NURSE ANESTHETIST, CERTIFIED REGISTERED

## 2023-10-10 PROCEDURE — 86901 BLOOD TYPING SEROLOGIC RH(D): CPT

## 2023-10-10 PROCEDURE — 63600000 HC DRUGS/DETAIL CODE: Performed by: NURSE PRACTITIONER

## 2023-10-10 PROCEDURE — 25000000 HC PHARMACY GENERAL

## 2023-10-10 PROCEDURE — 63700000 HC SELF-ADMINISTRABLE DRUG: Performed by: PHYSICIAN ASSISTANT

## 2023-10-10 PROCEDURE — 71000001 HC PACU PHASE 1 INITIAL 30MIN: Performed by: ORTHOPAEDIC SURGERY

## 2023-10-10 DEVICE — CUP ACETAB BENCOX MIRABO 3HOLE 52MM: Type: IMPLANTABLE DEVICE | Site: HIP | Status: FUNCTIONAL

## 2023-10-10 DEVICE — BENCOX M STEM LATERALIZED OFFSET (STERILE)
Type: IMPLANTABLE DEVICE | Site: HIP | Status: FUNCTIONAL
Brand: BENCOX HIP SYSTEM

## 2023-10-10 DEVICE — BENCOX BONE SCREW
Type: IMPLANTABLE DEVICE | Site: HIP | Status: FUNCTIONAL
Brand: BENCOX HIP SYSTEM

## 2023-10-10 DEVICE — BENCOX MIRABO PE LINER-STANDARD TYPE (GUR1050)
Type: IMPLANTABLE DEVICE | Site: HIP | Status: FUNCTIONAL
Brand: BENCOX HIP SYSTEM

## 2023-10-10 DEVICE — BENCOX DELTA HEAD
Type: IMPLANTABLE DEVICE | Site: HIP | Status: FUNCTIONAL
Brand: BENCOX HIP SYSTEM

## 2023-10-10 RX ORDER — MORPHINE SULFATE 2 MG/ML
2 INJECTION, SOLUTION INTRAMUSCULAR; INTRAVENOUS EVERY 4 HOURS PRN
Status: DISCONTINUED | OUTPATIENT
Start: 2023-10-10 | End: 2023-10-11 | Stop reason: HOSPADM

## 2023-10-10 RX ORDER — ATROPINE SULFATE 0.4 MG/ML
0.2 INJECTION, SOLUTION ENDOTRACHEAL; INTRAMEDULLARY; INTRAMUSCULAR; INTRAVENOUS; SUBCUTANEOUS EVERY 5 MIN PRN
Status: DISCONTINUED | OUTPATIENT
Start: 2023-10-10 | End: 2023-10-10 | Stop reason: HOSPADM

## 2023-10-10 RX ORDER — TIZANIDINE 4 MG/1
4 TABLET ORAL EVERY 6 HOURS PRN
Status: DISCONTINUED | OUTPATIENT
Start: 2023-10-10 | End: 2023-10-10

## 2023-10-10 RX ORDER — MEPERIDINE HYDROCHLORIDE 25 MG/ML
12.5 INJECTION INTRAMUSCULAR; INTRAVENOUS; SUBCUTANEOUS EVERY 10 MIN PRN
Status: DISCONTINUED | OUTPATIENT
Start: 2023-10-10 | End: 2023-10-10 | Stop reason: HOSPADM

## 2023-10-10 RX ORDER — ONDANSETRON 4 MG/1
4 TABLET, ORALLY DISINTEGRATING ORAL EVERY 8 HOURS PRN
Status: DISCONTINUED | OUTPATIENT
Start: 2023-10-10 | End: 2023-10-11 | Stop reason: HOSPADM

## 2023-10-10 RX ORDER — PROPOFOL 200MG/20ML
SYRINGE (ML) INTRAVENOUS AS NEEDED
Status: DISCONTINUED | OUTPATIENT
Start: 2023-10-10 | End: 2023-10-10 | Stop reason: SURG

## 2023-10-10 RX ORDER — MIDAZOLAM HYDROCHLORIDE 2 MG/2ML
INJECTION, SOLUTION INTRAMUSCULAR; INTRAVENOUS AS NEEDED
Status: DISCONTINUED | OUTPATIENT
Start: 2023-10-10 | End: 2023-10-10 | Stop reason: SURG

## 2023-10-10 RX ORDER — EPHEDRINE SULFATE/0.9% NACL/PF 50 MG/5 ML
10 SYRINGE (ML) INTRAVENOUS AS NEEDED
Status: DISCONTINUED | OUTPATIENT
Start: 2023-10-10 | End: 2023-10-10 | Stop reason: HOSPADM

## 2023-10-10 RX ORDER — LABETALOL HYDROCHLORIDE 5 MG/ML
5 INJECTION, SOLUTION INTRAVENOUS AS NEEDED
Status: DISCONTINUED | OUTPATIENT
Start: 2023-10-10 | End: 2023-10-10 | Stop reason: HOSPADM

## 2023-10-10 RX ORDER — DEXAMETHASONE SODIUM PHOSPHATE 4 MG/ML
INJECTION, SOLUTION INTRA-ARTICULAR; INTRALESIONAL; INTRAMUSCULAR; INTRAVENOUS; SOFT TISSUE AS NEEDED
Status: DISCONTINUED | OUTPATIENT
Start: 2023-10-10 | End: 2023-10-10 | Stop reason: SURG

## 2023-10-10 RX ORDER — DIPHENHYDRAMINE HCL 50 MG/ML
25 VIAL (ML) INJECTION EVERY 6 HOURS PRN
Status: DISCONTINUED | OUTPATIENT
Start: 2023-10-10 | End: 2023-10-11 | Stop reason: HOSPADM

## 2023-10-10 RX ORDER — METOPROLOL TARTRATE 25 MG/1
25 TABLET, FILM COATED ORAL 2 TIMES DAILY
Status: DISCONTINUED | OUTPATIENT
Start: 2023-10-10 | End: 2023-10-11

## 2023-10-10 RX ORDER — PRAVASTATIN SODIUM 20 MG/1
20 TABLET ORAL NIGHTLY
Status: DISCONTINUED | OUTPATIENT
Start: 2023-10-10 | End: 2023-10-11 | Stop reason: HOSPADM

## 2023-10-10 RX ORDER — PHENYLEPHRINE HYDROCHLORIDE 10 MG/ML
INJECTION INTRAVENOUS AS NEEDED
Status: DISCONTINUED | OUTPATIENT
Start: 2023-10-10 | End: 2023-10-10 | Stop reason: SURG

## 2023-10-10 RX ORDER — SODIUM CHLORIDE 0.9 G/100ML
INJECTION, SOLUTION IRRIGATION
Status: DISCONTINUED | OUTPATIENT
Start: 2023-10-10 | End: 2023-10-10 | Stop reason: HOSPADM

## 2023-10-10 RX ORDER — FENTANYL CITRATE 50 UG/ML
50 INJECTION, SOLUTION INTRAMUSCULAR; INTRAVENOUS EVERY 5 MIN PRN
Status: DISCONTINUED | OUTPATIENT
Start: 2023-10-10 | End: 2023-10-10 | Stop reason: HOSPADM

## 2023-10-10 RX ORDER — DIPHENHYDRAMINE HCL 25 MG
25 CAPSULE ORAL EVERY 6 HOURS PRN
Status: DISCONTINUED | OUTPATIENT
Start: 2023-10-10 | End: 2023-10-11 | Stop reason: HOSPADM

## 2023-10-10 RX ORDER — POVIDONE-IODINE 5 %
SOLUTION, NON-ORAL OPHTHALMIC (EYE)
Status: DISCONTINUED | OUTPATIENT
Start: 2023-10-10 | End: 2023-10-10 | Stop reason: HOSPADM

## 2023-10-10 RX ORDER — BUPIVACAINE HYDROCHLORIDE 7.5 MG/ML
INJECTION, SOLUTION INTRASPINAL
Status: COMPLETED | OUTPATIENT
Start: 2023-10-10 | End: 2023-10-10

## 2023-10-10 RX ORDER — FENTANYL CITRATE 50 UG/ML
INJECTION, SOLUTION INTRAMUSCULAR; INTRAVENOUS AS NEEDED
Status: DISCONTINUED | OUTPATIENT
Start: 2023-10-10 | End: 2023-10-10 | Stop reason: SURG

## 2023-10-10 RX ORDER — TRANEXAMIC ACID 10 MG/ML
1000 INJECTION, SOLUTION INTRAVENOUS ONCE
Status: COMPLETED | OUTPATIENT
Start: 2023-10-10 | End: 2023-10-10

## 2023-10-10 RX ORDER — SODIUM CHLORIDE, SODIUM LACTATE, POTASSIUM CHLORIDE, CALCIUM CHLORIDE 600; 310; 30; 20 MG/100ML; MG/100ML; MG/100ML; MG/100ML
INJECTION, SOLUTION INTRAVENOUS CONTINUOUS
Status: ACTIVE | OUTPATIENT
Start: 2023-10-10 | End: 2023-10-10

## 2023-10-10 RX ORDER — MIDAZOLAM HYDROCHLORIDE 2 MG/2ML
1 INJECTION, SOLUTION INTRAMUSCULAR; INTRAVENOUS ONCE AS NEEDED
Status: DISCONTINUED | OUTPATIENT
Start: 2023-10-10 | End: 2023-10-10 | Stop reason: HOSPADM

## 2023-10-10 RX ORDER — METOCLOPRAMIDE HYDROCHLORIDE 5 MG/ML
10 INJECTION INTRAMUSCULAR; INTRAVENOUS
Status: DISCONTINUED | OUTPATIENT
Start: 2023-10-10 | End: 2023-10-10 | Stop reason: HOSPADM

## 2023-10-10 RX ORDER — BISACODYL 10 MG/1
10 SUPPOSITORY RECTAL DAILY PRN
Status: DISCONTINUED | OUTPATIENT
Start: 2023-10-10 | End: 2023-10-11 | Stop reason: HOSPADM

## 2023-10-10 RX ORDER — OXYCODONE HYDROCHLORIDE 5 MG/1
5 TABLET ORAL
Status: CANCELLED | OUTPATIENT
Start: 2023-10-10

## 2023-10-10 RX ORDER — KETOROLAC TROMETHAMINE 15 MG/ML
15 INJECTION, SOLUTION INTRAMUSCULAR; INTRAVENOUS EVERY 6 HOURS
Status: DISCONTINUED | OUTPATIENT
Start: 2023-10-10 | End: 2023-10-11 | Stop reason: HOSPADM

## 2023-10-10 RX ORDER — ALUMINUM HYDROXIDE, MAGNESIUM HYDROXIDE, AND SIMETHICONE 1200; 120; 1200 MG/30ML; MG/30ML; MG/30ML
30 SUSPENSION ORAL EVERY 4 HOURS PRN
Status: DISCONTINUED | OUTPATIENT
Start: 2023-10-10 | End: 2023-10-11 | Stop reason: HOSPADM

## 2023-10-10 RX ORDER — TRANEXAMIC ACID 10 MG/ML
INJECTION, SOLUTION INTRAVENOUS
Status: COMPLETED
Start: 2023-10-10 | End: 2023-10-10

## 2023-10-10 RX ORDER — ONDANSETRON HYDROCHLORIDE 2 MG/ML
4 INJECTION, SOLUTION INTRAVENOUS
Status: DISCONTINUED | OUTPATIENT
Start: 2023-10-10 | End: 2023-10-10 | Stop reason: HOSPADM

## 2023-10-10 RX ORDER — KETOROLAC TROMETHAMINE 15 MG/ML
INJECTION, SOLUTION INTRAMUSCULAR; INTRAVENOUS
Status: DISPENSED
Start: 2023-10-10 | End: 2023-10-10

## 2023-10-10 RX ORDER — AMOXICILLIN 250 MG
1 CAPSULE ORAL 2 TIMES DAILY
Status: DISCONTINUED | OUTPATIENT
Start: 2023-10-10 | End: 2023-10-11 | Stop reason: HOSPADM

## 2023-10-10 RX ORDER — SODIUM CHLORIDE, SODIUM LACTATE, POTASSIUM CHLORIDE, CALCIUM CHLORIDE 600; 310; 30; 20 MG/100ML; MG/100ML; MG/100ML; MG/100ML
INJECTION, SOLUTION INTRAVENOUS CONTINUOUS
Status: ACTIVE | OUTPATIENT
Start: 2023-10-10 | End: 2023-10-11

## 2023-10-10 RX ORDER — ACETAMINOPHEN 325 MG/1
975 TABLET ORAL ONCE
Status: DISCONTINUED | OUTPATIENT
Start: 2023-10-10 | End: 2023-10-10

## 2023-10-10 RX ORDER — DEXAMETHASONE SODIUM PHOSPHATE 4 MG/ML
10 INJECTION, SOLUTION INTRA-ARTICULAR; INTRALESIONAL; INTRAMUSCULAR; INTRAVENOUS; SOFT TISSUE ONCE
Status: DISCONTINUED | OUTPATIENT
Start: 2023-10-11 | End: 2023-10-11 | Stop reason: HOSPADM

## 2023-10-10 RX ORDER — ASPIRIN 81 MG/1
81 TABLET ORAL 2 TIMES DAILY
Status: DISCONTINUED | OUTPATIENT
Start: 2023-10-10 | End: 2023-10-11 | Stop reason: HOSPADM

## 2023-10-10 RX ORDER — POLYETHYLENE GLYCOL 3350 17 G/17G
17 POWDER, FOR SOLUTION ORAL DAILY
Status: DISCONTINUED | OUTPATIENT
Start: 2023-10-10 | End: 2023-10-11 | Stop reason: HOSPADM

## 2023-10-10 RX ORDER — PROPOFOL 10 MG/ML
INJECTION, EMULSION INTRAVENOUS CONTINUOUS PRN
Status: DISCONTINUED | OUTPATIENT
Start: 2023-10-10 | End: 2023-10-10 | Stop reason: SURG

## 2023-10-10 RX ORDER — SODIUM CHLORIDE 9 MG/ML
INJECTION, SOLUTION INTRAVENOUS CONTINUOUS
Status: ACTIVE | OUTPATIENT
Start: 2023-10-10 | End: 2023-10-10

## 2023-10-10 RX ORDER — PANTOPRAZOLE SODIUM 40 MG/1
40 TABLET, DELAYED RELEASE ORAL
Status: DISCONTINUED | OUTPATIENT
Start: 2023-10-11 | End: 2023-10-11 | Stop reason: HOSPADM

## 2023-10-10 RX ORDER — LIDOCAINE HYDROCHLORIDE 20 MG/ML
INJECTION, SOLUTION EPIDURAL; INFILTRATION; INTRACAUDAL; PERINEURAL AS NEEDED
Status: DISCONTINUED | OUTPATIENT
Start: 2023-10-10 | End: 2023-10-10 | Stop reason: SURG

## 2023-10-10 RX ORDER — HYDRALAZINE HYDROCHLORIDE 20 MG/ML
5 INJECTION INTRAMUSCULAR; INTRAVENOUS
Status: DISCONTINUED | OUTPATIENT
Start: 2023-10-10 | End: 2023-10-10 | Stop reason: HOSPADM

## 2023-10-10 RX ORDER — ONDANSETRON HYDROCHLORIDE 2 MG/ML
INJECTION, SOLUTION INTRAVENOUS AS NEEDED
Status: DISCONTINUED | OUTPATIENT
Start: 2023-10-10 | End: 2023-10-10 | Stop reason: SURG

## 2023-10-10 RX ORDER — HYDROMORPHONE HYDROCHLORIDE 1 MG/ML
0.5 INJECTION, SOLUTION INTRAMUSCULAR; INTRAVENOUS; SUBCUTANEOUS EVERY 10 MIN PRN
Status: DISCONTINUED | OUTPATIENT
Start: 2023-10-10 | End: 2023-10-10 | Stop reason: HOSPADM

## 2023-10-10 RX ORDER — AMIODARONE HYDROCHLORIDE 200 MG/1
200 TABLET ORAL NIGHTLY
Status: DISCONTINUED | OUTPATIENT
Start: 2023-10-10 | End: 2023-10-11 | Stop reason: HOSPADM

## 2023-10-10 RX ORDER — ACETAMINOPHEN 325 MG/1
650 TABLET ORAL ONCE AS NEEDED
Status: CANCELLED | OUTPATIENT
Start: 2023-10-10

## 2023-10-10 RX ORDER — VANCOMYCIN HYDROCHLORIDE 1 G/20ML
INJECTION, POWDER, LYOPHILIZED, FOR SOLUTION INTRAVENOUS
Status: DISCONTINUED | OUTPATIENT
Start: 2023-10-10 | End: 2023-10-10 | Stop reason: HOSPADM

## 2023-10-10 RX ORDER — DIPHENHYDRAMINE HCL 50 MG/ML
12.5 VIAL (ML) INJECTION
Status: DISCONTINUED | OUTPATIENT
Start: 2023-10-10 | End: 2023-10-10 | Stop reason: HOSPADM

## 2023-10-10 RX ORDER — ONDANSETRON HYDROCHLORIDE 2 MG/ML
4 INJECTION, SOLUTION INTRAVENOUS EVERY 8 HOURS PRN
Status: DISCONTINUED | OUTPATIENT
Start: 2023-10-10 | End: 2023-10-11 | Stop reason: HOSPADM

## 2023-10-10 RX ORDER — OXYCODONE HYDROCHLORIDE 5 MG/1
5-10 TABLET ORAL EVERY 4 HOURS PRN
Status: DISCONTINUED | OUTPATIENT
Start: 2023-10-10 | End: 2023-10-11 | Stop reason: HOSPADM

## 2023-10-10 RX ORDER — CELECOXIB 200 MG/1
200 CAPSULE ORAL ONCE
Status: DISCONTINUED | OUTPATIENT
Start: 2023-10-10 | End: 2023-10-10

## 2023-10-10 RX ORDER — HEPARIN SODIUM 1000 [USP'U]/ML
INJECTION, SOLUTION INTRAVENOUS; SUBCUTANEOUS AS NEEDED
Status: DISCONTINUED | OUTPATIENT
Start: 2023-10-10 | End: 2023-10-10 | Stop reason: SURG

## 2023-10-10 RX ORDER — ACETAMINOPHEN 325 MG/1
975 TABLET ORAL EVERY 8 HOURS
Qty: 63 TABLET | Refills: 0 | Status: DISCONTINUED | OUTPATIENT
Start: 2023-10-10 | End: 2023-10-11 | Stop reason: HOSPADM

## 2023-10-10 RX ADMIN — PHENYLEPHRINE HYDROCHLORIDE 200 MCG: 10 INJECTION INTRAVENOUS at 08:22

## 2023-10-10 RX ADMIN — BUPIVACAINE HYDROCHLORIDE IN DEXTROSE 2 ML: 7.5 INJECTION, SOLUTION SUBARACHNOID at 07:52

## 2023-10-10 RX ADMIN — ASPIRIN 81 MG: 81 TABLET, COATED ORAL at 17:39

## 2023-10-10 RX ADMIN — TRANEXAMIC ACID 1000 MG: 10 INJECTION, SOLUTION INTRAVENOUS at 09:31

## 2023-10-10 RX ADMIN — MIDAZOLAM HYDROCHLORIDE 2 MG: 1 INJECTION, SOLUTION INTRAMUSCULAR; INTRAVENOUS at 07:52

## 2023-10-10 RX ADMIN — HEPARIN SODIUM 1000 UNITS: 1000 INJECTION, SOLUTION INTRAVENOUS; SUBCUTANEOUS at 08:10

## 2023-10-10 RX ADMIN — SODIUM CHLORIDE, POTASSIUM CHLORIDE, SODIUM LACTATE AND CALCIUM CHLORIDE: 600; 310; 30; 20 INJECTION, SOLUTION INTRAVENOUS at 08:38

## 2023-10-10 RX ADMIN — CEFAZOLIN 2 G: 2 INJECTION, POWDER, FOR SOLUTION INTRAMUSCULAR; INTRAVENOUS at 08:04

## 2023-10-10 RX ADMIN — SODIUM CHLORIDE 1 G: 900 INJECTION INTRAVENOUS at 16:46

## 2023-10-10 RX ADMIN — DEXAMETHASONE SODIUM PHOSPHATE 10 MG: 4 INJECTION, SOLUTION INTRAMUSCULAR; INTRAVENOUS at 08:10

## 2023-10-10 RX ADMIN — PROPOFOL 50 MG: 10 INJECTION, EMULSION INTRAVENOUS at 08:10

## 2023-10-10 RX ADMIN — PHENYLEPHRINE HYDROCHLORIDE 150 MCG: 10 INJECTION INTRAVENOUS at 08:42

## 2023-10-10 RX ADMIN — PHENYLEPHRINE HYDROCHLORIDE 100 MCG: 10 INJECTION INTRAVENOUS at 09:01

## 2023-10-10 RX ADMIN — MULTIPLE VITAMINS W/ MINERALS TAB 1 TABLET: TAB at 14:53

## 2023-10-10 RX ADMIN — PROPOFOL 50 MCG/KG/MIN: 10 INJECTION, EMULSION INTRAVENOUS at 08:10

## 2023-10-10 RX ADMIN — KETOROLAC TROMETHAMINE 15 MG: 15 INJECTION, SOLUTION INTRAMUSCULAR; INTRAVENOUS at 11:31

## 2023-10-10 RX ADMIN — METOPROLOL TARTRATE 25 MG: 25 TABLET, FILM COATED ORAL at 21:18

## 2023-10-10 RX ADMIN — LIDOCAINE HYDROCHLORIDE 3 ML: 20 INJECTION, SOLUTION EPIDURAL; INFILTRATION; INTRACAUDAL at 08:10

## 2023-10-10 RX ADMIN — ACETAMINOPHEN 975 MG: 325 TABLET ORAL at 22:53

## 2023-10-10 RX ADMIN — PHENYLEPHRINE HYDROCHLORIDE 150 MCG: 10 INJECTION INTRAVENOUS at 09:13

## 2023-10-10 RX ADMIN — PRAVASTATIN SODIUM 20 MG: 20 TABLET ORAL at 21:18

## 2023-10-10 RX ADMIN — TRANEXAMIC ACID 1000 MG: 100 INJECTION, SOLUTION INTRAVENOUS at 07:58

## 2023-10-10 RX ADMIN — POLYETHYLENE GLYCOL 3350 17 G: 17 POWDER, FOR SOLUTION ORAL at 14:53

## 2023-10-10 RX ADMIN — ACETAMINOPHEN 975 MG: 325 TABLET ORAL at 14:53

## 2023-10-10 RX ADMIN — PHENYLEPHRINE HYDROCHLORIDE 100 MCG: 10 INJECTION INTRAVENOUS at 08:32

## 2023-10-10 RX ADMIN — ONDANSETRON HYDROCHLORIDE 4 MG: 2 INJECTION, SOLUTION INTRAMUSCULAR; INTRAVENOUS at 08:30

## 2023-10-10 RX ADMIN — FENTANYL CITRATE 100 MCG: 50 INJECTION, SOLUTION INTRAMUSCULAR; INTRAVENOUS at 07:52

## 2023-10-10 RX ADMIN — SENNOSIDES AND DOCUSATE SODIUM 1 TABLET: 50; 8.6 TABLET ORAL at 21:18

## 2023-10-10 RX ADMIN — SODIUM CHLORIDE: 9 INJECTION, SOLUTION INTRAVENOUS at 14:53

## 2023-10-10 RX ADMIN — SODIUM CHLORIDE, POTASSIUM CHLORIDE, SODIUM LACTATE AND CALCIUM CHLORIDE: 600; 310; 30; 20 INJECTION, SOLUTION INTRAVENOUS at 08:04

## 2023-10-10 RX ADMIN — PHENYLEPHRINE HYDROCHLORIDE 50 MCG: 10 INJECTION INTRAVENOUS at 08:52

## 2023-10-10 RX ADMIN — KETOROLAC TROMETHAMINE 15 MG: 15 INJECTION, SOLUTION INTRAMUSCULAR; INTRAVENOUS at 17:38

## 2023-10-10 RX ADMIN — PHENYLEPHRINE HYDROCHLORIDE 100 MCG: 10 INJECTION INTRAVENOUS at 08:12

## 2023-10-10 RX ADMIN — AMIODARONE HYDROCHLORIDE 200 MG: 200 TABLET ORAL at 21:19

## 2023-10-10 ASSESSMENT — COGNITIVE AND FUNCTIONAL STATUS - GENERAL
STANDING UP FROM CHAIR USING ARMS: 3 - A LITTLE
STANDING UP FROM CHAIR USING ARMS: 3 - A LITTLE
MOVING TO AND FROM BED TO CHAIR: 3 - A LITTLE
MOVING TO AND FROM BED TO CHAIR: 3 - A LITTLE
AFFECT: WFL
WALKING IN HOSPITAL ROOM: 3 - A LITTLE
WALKING IN HOSPITAL ROOM: 3 - A LITTLE
MOVING TO AND FROM BED TO CHAIR: 3 - A LITTLE
CLIMB 3 TO 5 STEPS WITH RAILING: 3 - A LITTLE
STANDING UP FROM CHAIR USING ARMS: 3 - A LITTLE
WALKING IN HOSPITAL ROOM: 3 - A LITTLE

## 2023-10-10 ASSESSMENT — ENCOUNTER SYMPTOMS
COUGH: 0
FALLS: 0
LIGHT-HEADEDNESS: 0
VOMITING: 0
SYNCOPE: 0
SHORTNESS OF BREATH: 0
FEVER: 0
NEAR-SYNCOPE: 0
PALPITATIONS: 0
ABDOMINAL PAIN: 0

## 2023-10-10 NOTE — PROGRESS NOTES
Pt seen & examined in PACU  No complaints, pain well controlled  Denies Cp, SOB, palpitations, nausea & vomiting    Alert, VSS, NAD  RLE aquacell dressing c/d/i with ice pack in place  BLE DP/PT intact, unable to obtain motor/sensory exam secondary to spinal anesthesia  Calves soft, nontender    A/P: s/p  Right RACHEL POD 0 d/w Attending  Pain control  Post op xray  DVT ppx: early ambulation, SCDS, & aspirin 81mg bid x7 days then home Eliquis  PT/OT  WBAT  [place] [facility] [place]  Cardiac management CCP  Decadron 10mg IV x1 POD 1   dispo planing likely home tmw pending clearances

## 2023-10-10 NOTE — CONSULTS
Cardiology Consult Note    HISTORY OF PRESENT ILLNESS      Karina Talavera is a 68 y.o. female who is referred for consultation on 10/10/2023 for postoperative cardiac management. She is s/p R hip replacement today. She has no shortness of breath, chest pain, or lightheadedness. She last took Eliquis on 10/7. She took her metoprolol this AM. She is normotensive and had periods of hypotension in the OR.     She follows with Dr. Miguel Lay and records were reviewed in [facility]. She has a history of hypertrophic cardiomyopathy and is on mavacamten. She also has a history of atrial fibrillation on amiodarone, Eliquis, and metoprolol. She has hyperlipidemia.    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 No pertinent family history.    MEDICATIONS     Home Medications:  Prior to Admission medications    Medication Sig Start Date End Date Taking? Authorizing Provider   acetaminophen (TYLENOL) 500 mg tablet Take 1,000 mg by mouth every 6 (six) hours as needed.   Yes Provider, Chaitanya Sparks MD   amiodarone (PACERONE) 200 mg tablet Take 200 mg by mouth nightly. 8/31/23  Yes ProviderChaitanya MD   CAMZYOS 10 mg capsule capsule Take 10 mg by mouth nightly. 8/24/23  Yes Provider, Chaitanya Sparks MD   glucosamine HCl/chondroitin lujan (GLUCOSAMINE-CHONDROITIN ORAL) Take 1 tablet by mouth 3 (three) times a day.   Yes  Provider, Chaitanya Sparks MD   metoprolol tartrate (LOPRESSOR) 25 mg tablet Take 25 mg by mouth 2 (two) times a day. 8/9/23  Yes ProviderChaitanya MD   pravastatin (PRAVACHOL) 20 mg tablet Take 20 mg by mouth nightly. 9/5/23  Yes Provider, Chaitanya Sparks MD   ELIQUIS 5 mg tablet Take 5 mg by mouth 2 (two) times a day. 8/8/23   Provider, Chaitanya Sparks MD       Current Inpatient Medications:   acetaminophen  975 mg oral q8h HEATHER    aspirin  81 mg oral BID    ceFAZolin  1 g intravenous q8h INT    [START ON 10/11/2023] dexamethasone  10 mg intravenous Once    ketorolac        ketorolac  15 mg intravenous q6h HEATHER    multivitamin  1 tablet oral Daily    [START ON 10/11/2023] pantoprazole  40 mg oral Daily before breakfast    polyethylene glycol  17 g oral Daily    sennosides-docusate sodium  1 tablet oral BID      lactated ringer's   Stopped (10/10/23 0925)    sodium chloride 0.9 %           ALLERGIES     Allergies:  Patient has no known allergies.    REVIEW OF SYSTEMS     Review of Systems:  Review of Systems   Constitutional: Negative for fever.   Cardiovascular: Negative for chest pain, leg swelling, near-syncope, palpitations and syncope.   Respiratory: Negative for cough and shortness of breath.    Musculoskeletal: Negative for falls.   Gastrointestinal: Negative for abdominal pain and vomiting.   Neurological: Negative for light-headedness.   All other systems reviewed and are negative.      PHYSICAL EXAMINATION     Vital Signs Last 24 Hours:  Temp:  [36.4 °C (97.6 °F)-36.7 °C (98 °F)] 36.5 °C (97.7 °F)  Heart Rate:  [60-99] 90  Resp:  [10-30] 18  BP: ()/(44-89) 105/60    Intake/Output Summary (Last 24 hours) at 10/10/2023 1433  Last data filed at 10/10/2023 1300  Gross per 24 hour   Intake 1900 ml   Output 985 ml   Net 915 ml       Physical Exam:  Physical Exam  Constitutional:       General: She is not in acute distress.     Appearance: She is well-developed.   HENT:      Head:  Normocephalic.   Eyes:      Conjunctiva/sclera: Conjunctivae normal.   Neck:      Thyroid: No thyromegaly.      Vascular: No JVD.   Cardiovascular:      Rate and Rhythm: Normal rate. Rhythm irregularly irregular.      Pulses: Intact distal pulses.      Heart sounds: Normal heart sounds.   Pulmonary:      Effort: No respiratory distress.      Breath sounds: Normal breath sounds.   Abdominal:      General: Bowel sounds are normal.      Palpations: Abdomen is soft.   Musculoskeletal:      Cervical back: Neck supple.      Right lower leg: No edema.      Left lower leg: No edema.   Skin:     General: Skin is warm and dry.   Neurological:      Mental Status: She is alert and oriented to person, place, and time.   Psychiatric:         Behavior: Behavior normal.         LABS / IMAGING / ECG / TELEMETRY     Labs:  Results from last 7 days   Lab Units 10/10/23  1310   WBC K/uL 12.70*   HEMOGLOBIN g/dL 9.8*   HEMATOCRIT % 31.0*   PLATELETS K/uL 168     Imaging:  X-RAY HIP WITH OR WITHOUT PELVIS 1 VW RIGHT    Result Date: 10/10/2023  IMPRESSION: Status post right hip arthroplasty expected postop changes.    Echo 7/2023  Normal LV size. There is focal basal septal hypertrophy. Basal anteroseptal wall thickness = 15 mm. Visually assessed LVEF = 55%. Grossly normal LV regional wall motion. Apical foreshortening and suboptimal LV endocardial border visualization limited more definitive assessment of LV function.  --No evidence of dynamic LVOT or LV intracavitary obstruction (with and without Valsalva maneuver).  --By qualitative assessment, there is mild to moderate mitral regurgitation. MR quantitation was not performed. There is no chordal or leaflet GERARDO.  --Normal right ventricular cavity size and systolic function.  --No aortic stenosis. Trace aortic regurgitation.  --Mild tricuspid regurgitation. Assuming RAP = 3 mmHg, PASP = 24 mmHg.     Compared to prior study dated 4/23, global LV systolic function has diminished. Atrial  fibrillation is newly noted.    EC23 - afib with RVR. Inferolateral ST/T abnormality    Telemetry: Independently interpreted - atrial fibrillation 90-100s     Reviewed labs, ECG, prior echo, outpatient records.    ASSESSMENT AND PLAN     Osteoarthritis of right hip  Assessment & Plan  S/p R hip replacement on 10/10  Tolerated well from a cardiac perspective  Pain management and DVT prophylaxis as per surgical team    Dyslipidemia  Assessment & Plan  Continue statin    Atrial fibrillation (CMS/[facility])  Assessment & Plan  She has been sustaining atrial fibrillation and has outpatient plans for rhythm control with her primary cardiologist.    Atrial fibrillation rates are acceptably controlled (90-100s).  Continue metoprolol, amiodarone  Eliquis held for surgery. Resumption as soon as safe from a surgical perspective.    Hypertrophic cardiomyopathy (CMS/[facility])  Assessment & Plan  Continue mavacamten 5 mg nightly. She brought this from home    Discussed with ELEANOR.    ABILIO Lee  10/10/2023

## 2023-10-10 NOTE — ANESTHESIA POSTPROCEDURE EVALUATION
Patient: Karina Talavera    Procedure Summary     Date: 10/10/23 Room / Location:  [facility] OR 06 /  OR [facility]    Anesthesia Start: 0804 Anesthesia Stop: 0923    Procedure: Right total hip replacement (Right: Hip) Diagnosis:       Osteoarthritis of right hip, unspecified osteoarthritis type      (right hip djd)    Surgeons: Scout Boyer MD Responsible Provider: Baldomero Oneill IV, MD    Anesthesia Type: spinal ASA Status: 3          Anesthesia Type: spinal  PACU Vitals  10/10/2023 0913 - 10/10/2023 1013      10/10/2023  0921 10/10/2023  0930 10/10/2023  0940 10/10/2023  0950    BP: 125/59 111/66 109/72 101/56    Temp: 36.4 °C (97.6 °F) -- -- --    Pulse: 60 66 66 60    Resp: 10 19 16 13    SpO2: 100 % 94 % 100 % 99 %              10/10/2023  1000 10/10/2023  1010          BP: 115/67 129/76      Temp: -- --      Pulse: 60 64      Resp: 12 11      SpO2: 100 % 100 %              Anesthesia Post Evaluation    Pain management: adequate  Mode of pain management: IV medication  Patient location during evaluation: PACU  Patient participation: complete - patient participated  Level of consciousness: arousable  Cardiovascular status: acceptable  Airway Patency: adequate  Respiratory status: acceptable  Hydration status: acceptable  Anesthetic complications: no

## 2023-10-10 NOTE — ASSESSMENT & PLAN NOTE
-Status post right total hip arthroplasty by Dr. Boyer on 10/10  -Continue pain control combination of Tylenol, oxycodone and morphine  -Defer DVT prophylaxis to orthopedic team with aspirin twice daily.  Can transition back to Eliquis after 7 days of aspirin  -Add stool softeners to prevent immobility/opioid-induced constipation  -PT/OT evaluation and treatment

## 2023-10-10 NOTE — PLAN OF CARE
Care Coordination Discharge Plan Summary    Admission Assessment Summary    General Information  Readmission Within the last 30 days: no previous admission in last 30 days  Does patient have a :    Patient-Specific Goals (include timeframe): To go home    Living Arrangements  Arrived From:    Current Living Arrangements: home  People in Home: spouse  Home Accessibility: stairs within home (Group), stairs to enter home (Group)  Living Arrangement Comments: Lives w/ spouse in 2 story home w/ 2 steps to enter. Has 1st floor powder room. Full flight of steps to bedroom and bath. Plans to go to their [facility] in [place], [place] at discharge. They have 3 steps to enter and 1st floor setup. Their daughter will be helping her as well.    Social Determinants of Health - Screenings  Housing Stability  In the last 12 months, was there a time when you were not able to pay the mortgage or rent on time?: No  In the last 12 months, how many places have you lived?: 1  In the last 12 months, was there a time when you did not have a steady place to sleep or slept in a shelter (including now)?: No  Financial Resource Strain  How hard is it for you to pay for the very basics like food, housing, medical care, and heating?: Not hard at all  Transportation Needs  In the past 12 months, has lack of transportation kept you from medical appointments or from getting medications?: No  In the past 12 months, has lack of transportation kept you from meetings, work, or from getting things needed for daily living?: No    Functional Status Prior to Admission  Assistive Device/Animal Currently Used at Home: walker, front-wheeled, commode, 3-in-1, cane, straight  Functional Status Comments: Independent at baseline.  IADL Comments: Independent at baseline. Family available to assist. She drives.    Discharge Needs Assessment    Concerns to be Addressed: discharge planning  Current Discharge Risk: physical  impairment  Anticipated Changes Related to Illness: none    Discharge Plan Summary    Patient Choice  Offered/Gave Vendor List: no  Patient's Choice of Community Agency(s): N/A       Concerns / Comments: CC met w/ patient, spouse, and daughter Melissa in room. Anticipated discharge plan is to go [place] [facility] [place] w/ 1st floor setup and no additional services. Family to drive her. Preferred pharmacy for discharge medications is [id number] - [address] AT [facility].    Discharge Plan:  Disposition/Destination: Home  / Home       Connection to Community  Not applicable    Community Resources:      Discharge Transportation:  Is Out of Hospital DNR needed at Discharge: no  Does patient need discharge transport? No

## 2023-10-10 NOTE — ANESTHESIOLOGIST PRE-PROCEDURE ATTESTATION
Pre-Procedure Patient Identification:  I am the Primary Anesthesiologist and have identified the patient on 10/10/23 at 6:22 AM.   I have confirmed the procedure(s) will be performed by the following surgeon/proceduralist Scout Boyer MD.

## 2023-10-10 NOTE — ANESTHESIA PREPROCEDURE EVALUATION
"Relevant Problems   CARDIOVASCULAR   (+) Hypertrophic cardiomyopathy (CMS/HCC)   (+) Paroxysmal atrial fibrillation (CMS/HCC)      MUSCULOSKELETAL   (+) Osteoarthritis of right hip     Patient Active Problem List   Diagnosis    Osteoarthritis of right hip    Hypertrophic cardiomyopathy (CMS/HCC)    Paroxysmal atrial fibrillation (CMS/HCC)       No current facility-administered medications for this encounter.       Prior to Admission medications    Medication Sig Start Date End Date Taking? Authorizing Provider   acetaminophen (TYLENOL) 500 mg tablet Take 1,000 mg by mouth every 6 (six) hours as needed.    ProviderChaitanya MD   amiodarone (PACERONE) 200 mg tablet Take 200 mg by mouth nightly. 8/31/23   Chaitanya Quintanilla MD   CAMZYOS 10 mg capsule capsule Take 10 mg by mouth nightly. 8/24/23   Chaitanya Quintanilla MD   ELIQUIS 5 mg tablet Take 5 mg by mouth 2 (two) times a day. 8/8/23   Chaitanya Quintanilla MD   glucosamine HCl/chondroitin lujan (GLUCOSAMINE-CHONDROITIN ORAL) Take 1 tablet by mouth 3 (three) times a day.    ProviderChaitanya MD   metoprolol tartrate (LOPRESSOR) 25 mg tablet Take 25 mg by mouth 2 (two) times a day. 8/9/23   Chaitanya Quintanilla MD   pravastatin (PRAVACHOL) 20 mg tablet Take 20 mg by mouth nightly. 9/5/23   Chaitanya Quintanilla MD       CBC Results       09/14/23     0946    WBC 5.25    RBC 4.39    HGB 13.4    HCT 41.7    MCV 95.0    MCH 30.5    MCHC 32.1              BMP Results       09/14/23     0946        K 4.5    Cl 107    CO2 28    Glucose 92    BUN 13    Creatinine 0.8    Calcium 9.5    Anion Gap 7    EGFR >60.0          No results found for: \"HCGPREGUR\", \"PREGSERUM\", \"HCG\", \"HCGQUANT\"          No orders to display         ROS/Med Hx     Past Surgical History:   Procedure Laterality Date    CARDIOVERSION  08/2023    DILATION AND CURETTAGE OF UTERUS         Physical Exam    Airway   Mallampati: II   TM distance: >3 FB   " Neck ROM: full  Cardiovascular - normal   Rhythm: regular   Rate: normalPulmonary - normal   clear to auscultation  Dental - normal    Hypertrophic cardiomyopathy (CMS/HCC)  History of HOCM with LVOT obstruction.  She reports this has improved with the use of mavacamten.  She follows closely with Dr. Lay, her cardiologist.   Ultimately defer preoperative cardiovascular risk assessment and recommendations for further testing to the patient's cardiologist. She will be seeing Dr. Lay on 10/2/23.   Recommend telemetry post op. Defer further management of afib and HOCM to the patient's cardiologist.  She will continue Mavacamten per her cardiologist recommendations.     Paroxysmal atrial fibrillation (CMS/HCC)  The patient had a DCCM in August, but continues to have intermittent atrial fibrillation since then.  She states she does not have any significant symptoms but knows when she is in/out of atrial fibrillation.  Her cardiologist is aware of this and recently started amiodarone.  The patient will see her cardiologist prior to the surgery for further recommendations.  She will continue metoprolol the morning of surgery.  Telemetry post op.  She will hold Eliquis 72 hours prior to surgery.   The patient is aware of the increased risk of thrombosis or stroke while her AC is on hold for surgery.  Recommend resuming Eliquis as soon as ok with the surgeon post op.       Anesthesia Plan    Plan: spinal    Technique: spinal     Lines and Monitors: PIV     instructed to abstain from smoking on day of procedure    Patient did not smoke on day of surgery   3 ASA  Anesthetic plan and risks discussed with: patient  Postop Plan:   Patient Disposition: inpatient floor planned admission   Pain Management: IV analgesics

## 2023-10-10 NOTE — HOSPITAL COURSE
Karina is a 68 y.o. female admitted on 10/10/2023 with Osteoarthritis of right hip, unspecified osteoarthritis type [M16.11]  Osteoarthritis [M19.90]. Principal problem is OA of R hip.    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    History of Present Illness   Pt underwent R RACHEL with Dr. Boyer on 10/10/2023; WBAT R LE. No hip precautions per orders.

## 2023-10-10 NOTE — ASSESSMENT & PLAN NOTE
S/p R hip replacement on 10/10  Tolerated well from a cardiac perspective  Pain management and DVT prophylaxis as per surgical team

## 2023-10-10 NOTE — ASSESSMENT & PLAN NOTE
History of hypertrophic cardiomyopathy.  -Blood pressure stabilized.  Adjust metoprolol based on blood pressure parameters

## 2023-10-10 NOTE — NURSING NOTE
Pt aaox3, minimal pain. Pt oob to chair with assist x1, voided. NV checks intact. Post op vs documented. Call bell in reach.

## 2023-10-10 NOTE — PLAN OF CARE
Care Coordination Admission Assessment Note    General Information:  Readmission Within the last 30 days: no previous admission in last 30 days  Does patient have a :    Patient-Specific Goals (include timeframe): To go home    Living Arrangements:  Arrived From:    Current Living Arrangements: home  People in Home: spouse  Home Accessibility: stairs within home (Group), stairs to enter home (Group)  Living Arrangement Comments: Lives w/ spouse in 2 story home w/ 2 steps to enter. Has 1st floor powder room. Full flight of steps to bedroom and bath. Plans to go to their [facility] in [place], [place] at discharge. They have 3 steps to enter and 1st floor setup. Their daughter will be helping her as well.    Housing Stability and Financial Resources (SDOH):  In the last 12 months, was there a time when you were not able to pay the mortgage or rent on time?: No  In the last 12 months, how many places have you lived?: 1  In the last 12 months, was there a time when you did not have a steady place to sleep or slept in a shelter (including now)?: No  How hard is it for you to pay for the very basics like food, housing, medical care, and heating?: Not hard at all    Functional Status Prior to Admission:   Assistive Device/Animal Currently Used at Home: walker, front-wheeled, commode, 3-in-1, cane, straight  Functional Status Comments: Independent at baseline.  IADL Comments: Independent at baseline. Family available to assist. She drives.     Supports and Services:  Current Outpatient/Agency/Support Group: none  Type of Current Home Care Services:    History of home care episode or rehab stay:      Discharge Needs Assessment:   Concerns to be Addressed: discharge planning  Current Discharge Risk: physical impairment  Anticipated Changes Related to Illness: none    Patient/Family Anticipated Discharge Plan:  Patient/Family Anticipates Transition To: home with family  Patient/Family Anticipated Services  at Transition: none    Connection to Community  Not applicable      Patient Choice:   Offered/Gave Vendor List: no  Patient's Choice of Community Agency(s): N/A       Anticipated Discharge Plan:  Met with patient. Provided education and contact information for Care Coordination services.: yes  Anticipated Discharge Disposition: home with assistance     Transportation Needs (SDOH):  Transportation Concerns: none  Transportation Anticipated: family or friend will provide  Is Out of Hospital DNR needed at discharge?: no    In the past 12 months, has lack of transportation kept you from medical appointments or from getting medications?: No  In the past 12 months, has lack of transportation kept you from meetings, work, or from getting things needed for daily living?: No    Concerns - comments: CC met w/ patient, spouse, and daughter Melissa in room. Anticipated discharge plan is to go to their [facility] home w/ 1st floor setup and no additional services. Family to drive her. Preferred pharmacy for discharge medications is [id number] - [address] AT [facility].

## 2023-10-10 NOTE — OR SURGEON
Pre-Procedure patient identification:  I am the primary operating surgeon/proceduralist and I have reviewed the applicable pathology reports and radiology studies for this procedure. I have identified the patient on 10/10/23 at 6:32 AM Scout Boyer MD  Phone Number: [phone]

## 2023-10-10 NOTE — ASSESSMENT & PLAN NOTE
She has been sustaining atrial fibrillation and has outpatient plans for rhythm control with her primary cardiologist.    Atrial fibrillation rates are acceptably controlled (90-100s).  Continue metoprolol, amiodarone  Eliquis held for surgery. Resumption as soon as safe from a surgical perspective.

## 2023-10-10 NOTE — ANESTHESIA PROCEDURE NOTES
Spinal Block    Patient location during procedure: pre-op  Staffing  Anesthesiologist: Baldomero Oneill IV, MD  Performed by: Baldomero Oneill IV, MD  Authorized by: Baldomero Oneill IV, MD    Reason for block: at surgeon's request  Preanesthetic Checklist  Completed: patient identified, surgical consent, pre-op evaluation, timeout performed, IV checked, risks and benefits discussed, monitors and equipment checked and sterile field maintained during procedure  Spinal Block  Patient position: sitting  Prep: ChloraPrep and site prepped and draped  Patient monitoring: heart rate, cardiac monitor, continuous pulse ox and blood pressure  Approach: midline  Location: L3-4  Injection technique: single-shot  Needle  Needle type: Quincke   Needle gauge: 22 G  Needle length: 3.5 in  Needle insertion depth: 5 cm  Assessment  Events: cerebrospinal fluid  Additional Notes  Procedure well tolerated. Vital signs stable.    Medications Administered -   bupivacaine PF (MARCAINE SPINAL) 0.75% intrathecal injection - intrathecal   2 mL - 10/10/2023 7:52:00 AM

## 2023-10-10 NOTE — CONSULTS
Consult Note     Hospital Medicine Service -  Inpatient Consultation         Requesting Physician: Scout Boyer   Reason for Consultation: Post op medical mgmt     HISTORY OF PRESENT ILLNESS        This is a 68 y.o. female with hypertrophic cardiomyopathy, dyslipidemia, history of basal cell carcinoma of the eyelid, atrial fibrillation, degenerative disease of the joints now status post elective right total hip arthroplasty.  Patient has no acute concerns at this time.  She only complains of pain of the right hip when she moves.  She denies any chest pain, palpitations, shortness of breath, abdominal pain, nausea or vomiting.    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   10/10/2023 at 0600    amiodarone (PACERONE) 200 mg tablet Take 200 mg by mouth nightly.   10/9/2023    CAMZYOS 10 mg capsule capsule Take 5 mg by mouth nightly.   10/9/2023    glucosamine HCl/chondroitin lujan (GLUCOSAMINE-CHONDROITIN ORAL) Take 1 tablet by mouth 3 (three) times a day.   Past Month    metoprolol tartrate (LOPRESSOR) 25 mg tablet Take 25 mg by mouth 2 (two) times a day.   10/10/2023    pravastatin (PRAVACHOL) 20 mg tablet Take 20 mg by mouth nightly.   10/9/2023    ELIQUIS 5 mg tablet Take 5 mg by mouth 2 (two) times a day.   10/7/2023       Current inpatient medications were personally reviewed.    ALLERGIES        Patient has no known allergies.    FAMILY HISTORY     "    History reviewed. No pertinent family history.    SOCIAL HISTORY        Social History     Socioeconomic History    Marital status:      Spouse name: None    Number of children: None    Years of education: None    Highest education level: None   Tobacco Use    Smoking status: Never    Smokeless tobacco: Never   Vaping Use    Vaping Use: Never used   Substance and Sexual Activity    Alcohol use: Yes     Comment: glass of wine 3-4 times per week    Drug use: Never    Sexual activity: Defer     Social Determinants of Health     Financial Resource Strain: Low Risk  (10/10/2023)    Overall Financial Resource Strain (CARDIA)     Difficulty of Paying Living Expenses: Not hard at all   Transportation Needs: No Transportation Needs (10/10/2023)    PRAPARE - Transportation     Lack of Transportation (Medical): No     Lack of Transportation (Non-Medical): No   Housing Stability: Low Risk  (10/10/2023)    Housing Stability Vital Sign     Unable to Pay for Housing in the Last Year: No     Number of Places Lived in the Last Year: 1     Unstable Housing in the Last Year: No       REVIEW OF SYSTEMS        All other systems reviewed and negative except as noted in HPI    PHYSICAL EXAMINATION        Visit Vitals  BP (!) 99/53 (BP Location: Right upper arm, Patient Position: Lying)   Pulse 90   Temp 36.5 °C (97.7 °F) (Temporal)   Resp 18   Ht 1.727 m (5' 8\")   Wt 55.8 kg (123 lb)   SpO2 99%   BMI 18.70 kg/m²     Body mass index is 18.7 kg/m².    Intake/Output Summary (Last 24 hours) at 10/10/2023 1510  Last data filed at 10/10/2023 1300  Gross per 24 hour   Intake 1900 ml   Output 985 ml   Net 915 ml       Physical Exam  General exam: Appears in no acute distress. Lying comfortably in bed.  Head: Normocephalic and atraumatic  Eyes: Pupils equal, round, reactive to light/accommodation.  External ocular muscles are intact.  Normal conjunctiva and sclera.  ENT: Moist oral mucosa  Neck: Supple without " lymphadenopathy  CV: Irregular rhythm, normal rate. S1-S2.  No murmurs, rubs, or gallops.  No peripheral edema noted.  Resp: Clear to auscultation bilaterally.  No wheezes, rales, or rhonchi.  Nonlabored respirations.  Abdomen: Soft, nontender, nondistended.  Bowel sounds are present and normoactive.  No organomegaly noted.  Extremities: R hip with surgical dressing  Skin: See Ortho notes  Neuro: Alert and oriented to time, place, and person.  Cranial nerves II through XII are intact.  No focal deficits are noted.  Psych: Pleasant and cooperative with examination.  LABS / EKG        Labs  Results from last 7 days   Lab Units 10/10/23  1310   WBC K/uL 12.70*   HEMOGLOBIN g/dL 9.8*   HEMATOCRIT % 31.0*   PLATELETS K/uL 168                   ECG/Telemetry     Imaging      ASSESSMENT AND RECOMMENDATIONS           Osteoarthritis of right hip  Assessment & Plan  S/p R RACHEL POD #0    Hypertrophic cardiomyopathy (CMS/HCC)  Assessment & Plan  Stable, no acute issues    Dyslipidemia  Assessment & Plan  On statin    Atrial fibrillation (CMS/HCC)  Assessment & Plan  Permanent afib. Rate controlled. On anticoagulation  Recommendations  1.  Agree with home medications that have ready been ordered.     Thank you for this interesting consult.  We will be happy to follow along with you.     Alaina Barrios MD  10/10/2023

## 2023-10-10 NOTE — OR SURGEON
Pre-Procedure patient identification:  I am the primary operating surgeon/proceduralist and I have reviewed the applicable pathology reports and radiology studies for this procedure. I have identified the patient on 10/10/23 at 6:31 AM Scout Boyer MD  Phone Number: [phone]

## 2023-10-10 NOTE — DISCHARGE INSTRUCTIONS
Continue with aspirin 81mg by mout twice daily for 7 days. On Wed 10/18 you may stop the aspirin and resume your home Eliquis       POST-OPERATIVE INSTRUCTIONS -HIP REPLACEMENT  Surgeon:  Dr. Scout Boyer   Nurse:  Ina Shira  Phone: [phone]  [facility] Phone: [phone]   Use for emergencies (including after business hours and weekends)     Congratulations!  You've made a big step!  Our focus now is on your recovery and getting you back to a healthy, happy, pain-free lifestyle.  Every day, you will find you can do a bit more.  You are literally teaching yourself to walk normally again! Here are answers to some common questions:  1. ACTIVITY/REHABILITATION/PHYSICALTHERAPY (PT)  There is a great deal of healing that will take place during the next several months of your recovery as your bone literally grows into the new implant and your muscles heal.  Please do not overdo it!  Let your hip heal and it will likely give you a lifetime of pain relief. Your focus should simply be on walking and doing your normal activities of daily living (ADL).  You use all the muscles around your hip when you walk and when you're up doing things.  You do not need to set records, simply walk as much as you feel comfortable every day.  It is normal to have discomfort, but if you are having intense pain, stop the activity and rest.  You'll notice just about every day that you are feeling stronger and can walk further with less assistance. Don't be concerned about specific exercises, JUST WALK!!  The plan is for you to go home directly from the hospital (there may be an exception, but it is rare).  Most people will be able to go home in 1 or 2 days after surgery.  The timing of your discharge will be determined by how well you do with the hospital physical therapy.  We want to make sure that you are safe to go home.  If you can move around the hospital without assistance, it's unlikely that you'll need extra assistance  when you get home.  Home care services; such as physical therapy and nursing are rarely required, even if you live alone. Remember, we simply want you to be able to safely get around your home and WALK!  Inpatient rehabilitation is almost never required in this day and age. In the unusual situation that it is necessary, it will be arranged by the hospital .  Even at inpatient rehab, we just want you to focus on walking. The vast majority of patients will NOT even need any formal outpatient PT!  However, if you are having any problems at home, please call us and we can discuss the option of a more formal PT program. We've learned that patients taking it easy for the first couple of weeks after surgery may have better outcomes. Doing intense PT right after surgery can cause pain and swelling.  If you avoid overdoing it, you will have less pain and swelling.  Every day you will feel stronger and more stable. You will use a walker at first and you should plan to advance to a cane sometime within a couple weeks after surgery - you can do this whenever you feel comfortable. Using the cane helps your muscles heal faster, so use the cane for about a month. Stay active, but don't overdo it - we want the tissues and bone to heal well before we start stressing the hip.  You can resume upper body exercises or start using a stationary bike (but not necessary!) after 2 weeks.  Remember: NO FORMAL Physical TherapyJust WALK!          2. SWELLING   It is NORMAL for your leg to swell after surgery.  If you're having a lot of swelling, you must spend more time elevating your leg well above your heart (see image below).  You may need to elevate your leg on 4 or 5 pillows for 30-60 minutes at a time, 5 times a day.  If your leg swelling is not improving, please let us know. Some swelling can persist for weeks to months after the procedure.    This is How to Elevate Your Legs above Heart Level:            To Reduce  Swelling and pain  Do this 30-60 mins at a time!  Do this 4 to 5 times a day!                3. MEDICINES  Typically, you are given prescriptions at discharge from the hospital. Let us know if you have any allergies to these medicines.  Your nurse will review your medicines upon your discharge.    Pain Medicine: Typically you will be given a prescription for oxycodone, hydrocodone, or tramadol.  Take this as necessary, but wean off of it to Tylenol as soon as you feel comfortable.  You should notice that you need less narcotic pain medicine as time passes. Do NOT drive while you are taking narcotic pain medicines.  Stop taking the pain medicine if you become dizzy or disoriented.  Constipation is a major side effect of narcotics and many patients will avoid narcotics, as they prefer to deal with a bit of pain rather than constipation.  If you stop taking the pain medicine, you can typically stop taking the stool softener. Gradually weaning off the narcotic pain medicines may be safer than abruptly stopping them.     Stool Softener: (usually Colace).  This helps to avoid constipation caused by the other medications. Take it 2 times per day for 2-4 weeks, or as long as you are taking the narcotics. It is fine to take over-the-counter laxatives in addition to Colace for constipation management.  If you are not constipated or you experience diarrhea, stop taking the stool softener!              Blood Thinners: Aspirin, Coumadin (warfarin), or Lovenox (enoxaparin) - ONLY ONE - you should not be on more than one of these medicines.  These medicines will help to reduce the risk of blood clots.  However, if your blood becomes too thin, you may see bleeding (at the surgical site, gums, in urine, rectum, etc.), severe bruising, or stomach upset.  Please call the office if this occurs.  Do not take vitamin K, vitamin E, or supplements until seen in the office.  It is fine to take a multivitamin. Please remember that the most  important way to reduce the risk of clot formation is to get up and get going!        ASPIRIN/ECOTRIN (nearly all patients):     If you are discharged with aspirin (either 81mg or 325mg), take it twice a day x 4 weeks.       COUMADIN (Warfarin):  If you are taking Coumadin, you must get a blood test (INR) drawn 2x/week (you'll have a prescription for this).  Your dose will be adjusted based on the lab values.  Stay on Coumadin for 4 weeks.  If you were taking Coumadin before surgery you may resume your normal dose at this time.  Typically the cardiologist or your medical doctor will follow your labs and adjust the Coumadin dose.  If you do not receive instruction from them or cannot get in touch with them, call our office.      LOVENOX (Enoxaparin):    This is an injection you will give to yourself for 14 days after surgery.  NOTE: If you notice any excessive bruising or bleeding from your surgical wound (or elsewhere), call our office.    Other Blood Thinners:  If you are taking other types of blood thinners such as Xarelto, Eliquis, Plavix, Aggrenox, Arixtra, Heparin, etc. please confirm your specific dosing and when to resume these medicines prior to your discharge.  Please DO NOT leave the hospital without a clear understanding of the instructions regarding blood thinners!    If you are discharged on low dose 81 mg Aspirin, you may resume Non-Steroidal Anti-Inflammatory Drugs (NSAID's) such as ibuprofen (Motrin), naproxen (Aleve), etc. Wait at least 1 hour after aspirin dose before taking the NSAID. Please be careful when taking NSAID's and while taking aspirin because it can cause damage to your stomach or lead to bleeding. Do not take NSAID's if you are discharged on Coumadin, Lovenox, or other blood thinners. Call our office if you have questions.    4. SURGICAL DRESSING/INCISION  A special dressing was placed over your wound at the time of surgery.  This has been shown to reduce the risk of infection. This  protects your wound and can remain on for up to 1 week.  Blood spotting is normal, but should be contained in the padding of the dressing. You can shower without covering it. Remove the dressing 7 days after your surgery. It may be helpful to warm up the dressing by placing a warm towel over the dressing for 10 minutes prior to removal, or get in the shower and let warm water run over dressing.  This is done in an attempt to reduce the pull on your skin. Once removed, simply use soap and water to clean the incision daily and leave the incision open to air. Do not apply any lotions, creams, or ointments to your incision until fully closed and healed (4-6 weeks). Do not soak in a tub or swim until incision fully closed and healed (4-6 weeks). There usually are no staples or stitches that will need to be removed from your incision (if you have them, call for an appointment to remove at 2 weeks post op).  There are deep sutures under the skin that will dissolve over time.              5. SLEEPING   It is NORMAL to have difficulty sleeping after orthopaedic surgery.  It may take several months until you are sleeping normally. Elevating your leg throughout the day will reduce swelling that builds up at night - this can help you get a better night's rest. We generally advise against taking sleeping medication postoperatively, unless you were doing so prior to the surgery.    6. SLEEPING POSITION   There are no restrictions regarding position.  When lying on your side, you may place a pillow between your knees for comfort, if necessary.     7. DRIVING  You must have advanced to a cane, be off narcotics, and feel comfortable and safe to drive! If it was your left hip, you may be ready after 1 week.  For the right hip, it usually takes longer; you may be ready as early as 3 weeks. You should drive only if you feel safe! If in doubt, call us.     8. INFECTION PRECAUTIONS FOR DENTAL APPOINTMENTS  You will need to take an  antibiotic approximately one hour before any dental appointments. Call our office for a prescription if you have a dental appointment scheduled. Current recommendations are to continue to do this for the first 2 years after your surgery.    9. FLYING/TRIPS    By 6 weeks after surgery, you should be able to take just about any trip. Prior to that, it is likely that it will be uncomfortable, especially for longer trips, so we advise you to plan your travel accordingly.  For patients who come from far away, you can typically fly home within the first week. During any long trip (plane, train, auto), we suggest that you to take time to get up, stretch your legs, and walk around. Take a 325mg Aspirin on the morning and night of your trip (if not on other blood thinners). Special cards stating that you have a hip replacement are no longer provided, as the [facility] no longer accepts them.  Plan to spend some extra time at airport security in case you set off the alarm when you pass through the metal detector!    10. DISABILITY FORMS  Please contact our  if you have any disability, work, or other forms that need to be completed. Forms can be processed via the  at our [facility]. Form processing requires a signed release of information on file and form payment. Turnaround time for form completion is 7-10 days.    11. POST-OP APPOINTMENT: call [phone] if you do not have one scheduled.  Your appointment was made when you scheduled surgery. Routine post op visits are between 4- 6 weeks after your procedure.    12. RETURN TO WORK   I expect my patients to return to work within 3 months from surgery.  Most patients return around 4-6 weeks post op. You can return when you feel ready. You can return with restrictions (if your job allows you to do so).  Call my nurse Ina if you need a note.                     Dr Boyer Team Tips:    Call my Nurse Ina if you have questions or  concerns when you are home.  Please call at any time if you experience worsening pain, new numbness/tingling or weakness, wound redness or drainage, bleeding, loss of motion, fevers (temp above 101 F), or chills.  Never hesitate to call if you have a concern.  [phone] for Non-Emergent needs: (Mon-Fri 8:30am to 4:00pm)  [phone] for Emergency needs during or after business hours     Pain medication refills- Call my nurse before you run out of medication (24 hours notice is preferred). Please keep track of how much medication you have left and call between 8:30 am and 3pm Monday through Friday. We do not prescribe medications after hours or on weekends.     We do not refill pain medication after 3 months from your surgery date. To prevent narcotic dependency, we recommend you start decreasing the amount of narcotic medication you are taking at 1 month from surgery (many patients do it sooner) and rely on just Tylenol or NSAID's for pain management.     It's normal to have an elevated temperature or low grade fever that may persist for 6 to 8 weeks after surgery. Call if 101 degrees or higher.    It's normal for the hip area to feel warm and can persist for up to 6 months.    It's normal to feel numbness around the incisional area (may resolve in 6 months to 1 year, but may be permanent). It's normal to feel a burning pain around the hip or near the incision (this will calm down as the swelling and inflammation lessens in your leg).    You might find it hard to raise or lift your leg on your own right after surgery. It might take up to 4 weeks before you regain full strength. The walking you do daily will help strengthen the leg.    It can be normal for some redness/pinkness to appear around the incision. Angry or bright redness should be a concern and call my nurse if this occurs.      If you develop blisters on your skin on the operative leg, please call my nurse Ina for further instructions and treatment  to help prevent infection.    It is normal to have bruising up and down the operative leg and may take a long time to go away.    It's good to apply ice for about 20 minutes 3-4 times a day (not directly on your skin though) to help with pain and swelling.    It's normal to hear or feel clicking in the hip (which may go away as inflammation goes down).    If you have increased swelling, you are likely overdoing it with activity.    It might take a few weeks to turn the corner after surgery. Try to stay positive during this time. You will be able to return to your normal activities at a gradual pace in due time.

## 2023-10-11 VITALS
RESPIRATION RATE: 16 BRPM | WEIGHT: 123 LBS | HEIGHT: 68 IN | TEMPERATURE: 98.6 F | DIASTOLIC BLOOD PRESSURE: 59 MMHG | BODY MASS INDEX: 18.64 KG/M2 | SYSTOLIC BLOOD PRESSURE: 118 MMHG | HEART RATE: 101 BPM | OXYGEN SATURATION: 99 %

## 2023-10-11 PROBLEM — D64.9 ACUTE ANEMIA: Status: ACTIVE | Noted: 2023-10-11

## 2023-10-11 PROBLEM — I48.21 PERMANENT ATRIAL FIBRILLATION (CMS/HCC): Status: ACTIVE | Noted: 2023-09-14

## 2023-10-11 LAB
ANION GAP SERPL CALC-SCNC: 5 MEQ/L (ref 3–15)
BUN SERPL-MCNC: 9 MG/DL (ref 7–25)
CALCIUM SERPL-MCNC: 8.6 MG/DL (ref 8.6–10.3)
CHLORIDE SERPL-SCNC: 108 MEQ/L (ref 98–107)
CO2 SERPL-SCNC: 27 MEQ/L (ref 21–31)
CREAT SERPL-MCNC: 0.6 MG/DL (ref 0.6–1.2)
ERYTHROCYTE [DISTWIDTH] IN BLOOD BY AUTOMATED COUNT: 14.2 % (ref 11.7–14.4)
FERRITIN SERPL-MCNC: 48 NG/ML (ref 11–250)
FOLATE SERPL-MCNC: 5.6 NG/ML
GFR SERPL CREATININE-BSD FRML MDRD: >60 ML/MIN/1.73M*2
GLUCOSE SERPL-MCNC: 112 MG/DL (ref 70–99)
HCT VFR BLDCO AUTO: 28 % (ref 35–45)
HGB BLD-MCNC: 9.1 G/DL (ref 11.8–15.7)
IRON SATN MFR SERPL: 6 % (ref 15–45)
IRON SERPL-MCNC: 19 UG/DL (ref 35–150)
MCH RBC QN AUTO: 31.5 PG (ref 28–33.2)
MCHC RBC AUTO-ENTMCNC: 32.5 G/DL (ref 32.2–35.5)
MCV RBC AUTO: 96.9 FL (ref 83–98)
PDW BLD AUTO: 10.4 FL (ref 9.4–12.3)
PLATELET # BLD AUTO: 165 K/UL (ref 150–369)
POTASSIUM SERPL-SCNC: 4.3 MEQ/L (ref 3.5–5.1)
RBC # BLD AUTO: 2.89 M/UL (ref 3.93–5.22)
SODIUM SERPL-SCNC: 140 MEQ/L (ref 136–145)
TIBC SERPL-MCNC: 330 UG/DL (ref 270–460)
UIBC SERPL-MCNC: 311 UG/DL (ref 180–360)
VIT B12 SERPL-MCNC: 174 PG/ML (ref 180–914)
WBC # BLD AUTO: 9.63 K/UL (ref 3.8–10.5)

## 2023-10-11 PROCEDURE — 63600000 HC DRUGS/DETAIL CODE: Mod: JZ | Performed by: NURSE PRACTITIONER

## 2023-10-11 PROCEDURE — 99232 SBSQ HOSP IP/OBS MODERATE 35: CPT | Performed by: HOSPITALIST

## 2023-10-11 PROCEDURE — 82728 ASSAY OF FERRITIN: CPT | Performed by: HOSPITALIST

## 2023-10-11 PROCEDURE — 80048 BASIC METABOLIC PNL TOTAL CA: CPT | Performed by: NURSE PRACTITIONER

## 2023-10-11 PROCEDURE — 25800000 HC PHARMACY IV SOLUTIONS: Performed by: NURSE PRACTITIONER

## 2023-10-11 PROCEDURE — 83540 ASSAY OF IRON: CPT | Performed by: HOSPITALIST

## 2023-10-11 PROCEDURE — 97116 GAIT TRAINING THERAPY: CPT | Mod: GP,CQ

## 2023-10-11 PROCEDURE — 63700000 HC SELF-ADMINISTRABLE DRUG: Performed by: NURSE PRACTITIONER

## 2023-10-11 PROCEDURE — 82746 ASSAY OF FOLIC ACID SERUM: CPT | Performed by: HOSPITALIST

## 2023-10-11 PROCEDURE — 85027 COMPLETE CBC AUTOMATED: CPT | Performed by: NURSE PRACTITIONER

## 2023-10-11 PROCEDURE — 97166 OT EVAL MOD COMPLEX 45 MIN: CPT | Mod: GO

## 2023-10-11 PROCEDURE — 82607 VITAMIN B-12: CPT | Performed by: HOSPITALIST

## 2023-10-11 PROCEDURE — 36415 COLL VENOUS BLD VENIPUNCTURE: CPT | Performed by: NURSE PRACTITIONER

## 2023-10-11 RX ORDER — AMOXICILLIN 250 MG
1 CAPSULE ORAL 2 TIMES DAILY PRN
Qty: 30 TABLET | Refills: 0 | Status: SHIPPED | OUTPATIENT
Start: 2023-10-11

## 2023-10-11 RX ORDER — POLYETHYLENE GLYCOL 3350 17 G/17G
17 POWDER, FOR SOLUTION ORAL DAILY PRN
Start: 2023-10-11 | End: 2023-10-14

## 2023-10-11 RX ORDER — OXYCODONE HYDROCHLORIDE 5 MG/1
5-10 TABLET ORAL EVERY 4 HOURS PRN
Qty: 30 TABLET | Refills: 0 | Status: SHIPPED | OUTPATIENT
Start: 2023-10-11 | End: 2023-10-18

## 2023-10-11 RX ORDER — ASPIRIN 81 MG/1
81 TABLET ORAL 2 TIMES DAILY
Qty: 13 TABLET | Refills: 0 | Status: SHIPPED | OUTPATIENT
Start: 2023-10-11 | End: 2023-10-18

## 2023-10-11 RX ADMIN — SODIUM CHLORIDE, POTASSIUM CHLORIDE, SODIUM LACTATE AND CALCIUM CHLORIDE 500 ML: 600; 310; 30; 20 INJECTION, SOLUTION INTRAVENOUS at 00:34

## 2023-10-11 RX ADMIN — ASPIRIN 81 MG: 81 TABLET, COATED ORAL at 08:31

## 2023-10-11 RX ADMIN — KETOROLAC TROMETHAMINE 15 MG: 15 INJECTION, SOLUTION INTRAMUSCULAR; INTRAVENOUS at 00:38

## 2023-10-11 RX ADMIN — SENNOSIDES AND DOCUSATE SODIUM 1 TABLET: 50; 8.6 TABLET ORAL at 08:31

## 2023-10-11 RX ADMIN — MULTIPLE VITAMINS W/ MINERALS TAB 1 TABLET: TAB at 08:31

## 2023-10-11 RX ADMIN — SODIUM CHLORIDE 1 G: 900 INJECTION INTRAVENOUS at 00:34

## 2023-10-11 RX ADMIN — ACETAMINOPHEN 975 MG: 325 TABLET ORAL at 06:47

## 2023-10-11 RX ADMIN — KETOROLAC TROMETHAMINE 15 MG: 15 INJECTION, SOLUTION INTRAMUSCULAR; INTRAVENOUS at 06:48

## 2023-10-11 ASSESSMENT — COGNITIVE AND FUNCTIONAL STATUS - GENERAL
CLIMB 3 TO 5 STEPS WITH RAILING: 4 - NONE
HELP NEEDED FOR PERSONAL GROOMING: 4 - NONE
DRESSING REGULAR LOWER BODY CLOTHING: 4 - NONE
MOVING TO AND FROM BED TO CHAIR: 4 - NONE
DRESSING REGULAR UPPER BODY CLOTHING: 4 - NONE
STANDING UP FROM CHAIR USING ARMS: 4 - NONE
HELP NEEDED FOR BATHING: 4 - NONE
TOILETING: 4 - NONE
EATING MEALS: 4 - NONE
MOVING TO AND FROM BED TO CHAIR: 4 - NONE
CLIMB 3 TO 5 STEPS WITH RAILING: 4 - NONE
WALKING IN HOSPITAL ROOM: 4 - NONE
WALKING IN HOSPITAL ROOM: 4 - NONE
AFFECT: WFL
STANDING UP FROM CHAIR USING ARMS: 4 - NONE

## 2023-10-11 NOTE — PROGRESS NOTES
Cardiology Daily Progress Note    SUBJECTIVE      Karina Talavera is a 68 y.o. female who was admitted for Osteoarthritis of right hip, unspecified osteoarthritis type [M16.11]  Osteoarthritis [M19.90].    INTERVAL HISTORY:  Patient seen and examined. Blood pressure on the low. Recommend increasing hydration.     MEDICATIONS     CURRENT IP MEDS:   acetaminophen  975 mg oral q8h HEATHER    amiodarone  200 mg oral Nightly    aspirin  81 mg oral BID    ketorolac  15 mg intravenous q6h HEATHER    mavacamten  5 mg oral Nightly    metoprolol tartrate  12.5 mg oral BID    multivitamin  1 tablet oral Daily    pantoprazole  40 mg oral Daily before breakfast    polyethylene glycol  17 g oral Daily    pravastatin  20 mg oral Nightly    sennosides-docusate sodium  1 tablet oral BID         OBJECTIVE     Vital signs in last 24 hours:  Temp:  [36.4 °C (97.5 °F)-36.7 °C (98.1 °F)] 36.7 °C (98.1 °F)  Heart Rate:  [] 96  Resp:  [10-30] 16  BP: ()/(44-79) 88/58      Intake/Output Summary (Last 24 hours) at 10/11/2023 0836  Last data filed at 10/10/2023 1300  Gross per 24 hour   Intake 1900 ml   Output 985 ml   Net 915 ml       WEIGHTS  Wt Readings from Last 3 Encounters:   10/10/23 55.8 kg (123 lb)   09/14/23 55.5 kg (122 lb 6.4 oz)       PHYSICAL EXAM:  Physical Exam  Constitutional:       Appearance: Normal appearance.   HENT:      Head: Normocephalic and atraumatic.      Nose: Nose normal.      Mouth/Throat:      Mouth: Mucous membranes are dry.   Eyes:      Conjunctiva/sclera: Conjunctivae normal.   Cardiovascular:      Rate and Rhythm: Normal rate. Rhythm irregularly irregular.      Pulses: Normal pulses.      Heart sounds: Normal heart sounds, S1 normal and S2 normal.   Pulmonary:      Effort: Pulmonary effort is normal.      Breath sounds: Normal breath sounds.   Abdominal:      Palpations: Abdomen is soft.   Musculoskeletal:      Cervical back: Neck supple.      Right lower leg: No edema.      Left lower  leg: No edema.   Skin:     General: Skin is warm and dry.   Neurological:      General: No focal deficit present.      Mental Status: She is alert and oriented to person, place, and time.   Psychiatric:         Attention and Perception: Attention and perception normal.         Mood and Affect: Mood normal.         LABS / IMAGING / EKG / TELEMETRY     LABS:   Results from last 7 days   Lab Units 10/11/23  0541   SODIUM mEQ/L 140   POTASSIUM mEQ/L 4.3   CHLORIDE mEQ/L 108*   CO2 mEQ/L 27   BUN mg/dL 9   CREATININE mg/dL 0.6   GLUCOSE mg/dL 112*   CALCIUM mg/dL 8.6               Results from last 7 days   Lab Units 10/11/23  0541 10/10/23  2327 10/10/23  1310   WBC K/uL 9.63  --  12.70*   HEMOGLOBIN g/dL 9.1* 9.3* 9.8*   HEMATOCRIT % 28.0* 28.5* 31.0*   PLATELETS K/uL 165  --  168           PT/PTT Results       10/10/23 09/14/23     0713 0946    PT 13.7 14.7    INR 1.1 1.2    PTT -- 32         Comment for INR at 0713 on 10/10/23: INR has no defined significance when PT is within Reference Range.    Comment for INR at 0946 on 09/14/23: Moderate Intensity Anticoagulation = 2.0 to 3.0, High Intensity = 2.5 to 3.5        Lab Results   Component Value Date    HGBA1C 5.7 (H) 09/14/2023       Imaging  X-RAY HIP WITH OR WITHOUT PELVIS 1 VW RIGHT    Result Date: 10/10/2023  IMPRESSION: Status post right hip arthroplasty expected postop changes.        ECG   No new ECG.    TELEMETRY atrial fibrillation       ASSESSMENT AND PLAN     Acute anemia  Assessment & Plan  With hypertrophic cardiomyopathy, may need blood but will defer to [facility]. Blood pressure on the lower side.     Dyslipidemia  Assessment & Plan  Continue statin    Permanent atrial fibrillation (CMS/HCC)  Assessment & Plan  She has been sustaining atrial fibrillation and has outpatient plans for rhythm control with her primary cardiologist.    Atrial fibrillation rates are acceptably controlled (90-100s).  Continue metoprolol, amiodarone  Eliquis held for surgery.  Resumption as soon as safe from a surgical perspective.    Hypertrophic cardiomyopathy (CMS/HCC)  Assessment & Plan  Continue mavacamten 5 mg nightly. She brought this from home    Primary osteoarthritis of right hip  Assessment & Plan  S/p R hip replacement on 10/10  Tolerated well from a cardiac perspective  Pain management and DVT prophylaxis as per surgical team    Okay from a cardiac standpoint for discharge when surgically and medically cleared.     Jayesh Do, DO  10/11/2023

## 2023-10-11 NOTE — PROGRESS NOTES
Occupational Therapy -  Initial Evaluation     Patient: Karina Talavera  Location: [place] [address]  MRN: [id number]  Today's date: 10/11/2023    HISTORY OF PRESENT ILLNESS     Karina is a 68 y.o. female admitted on 10/10/2023 with Osteoarthritis of right hip, unspecified osteoarthritis type [M16.11]  Osteoarthritis [M19.90]. Principal problem is OA of R hip.    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    History of Present Illness   Pt underwent R RACHEL with Dr. Boyer on 10/10/2023; WBAT R LE. No hip precautions per orders.    PRIOR LEVEL OF FUNCTION AND LIVING ENVIRONMENT     Prior Level of Function    Flowsheet Row Most Recent Value   Prior Level of Function Comment Pt was independent with ambulation in absence of assistive device. Dressed, bathed, and toileted without assistance. (+) . 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   Assistive Device Currently Used at Home --  [has rolling walker and commode,  (+) grab bar by toilet,  will be obtaining straight cane]        Prior Living Environment    Flowsheet Row Most Recent Value   People in Home spouse   Current Living Arrangements home   Home Accessibility stairs within home (Group), stairs to enter home (Group)   Living Environment Comment Pt will be returning to her 2-story summer home (in [place]) at discharge. Pt will have 1st-floor setup. 3 steps (no railing) to enter. Tub/shower on 1st floor, as well as outdoor walk-in shower (pt will bathe in latter). (+) grab bar adjacent to toilet on 1st floor. Pt will have initial support from her daughter for ~5 days post-discharge,  spouse will be present to provide assistance after said time period.        Occupational Profile    Flowsheet Row Most Recent Value   Successful  Occupations Registered Nurse   Environmental Supports and Barriers Lives with spouse        VITALS AND PAIN     OT Vitals    Date/Time Pulse SpO2 Pt Activity O2 Therapy BP BP Location BP Method Pt Position [facility]   10/11/23 0928 99 100 % At rest None (Room air) 118/59 Left upper arm Automatic Sitting KL      OT Pain    Date/Time Pain Type Side/Orientation Location Rating: Rest Rating: Activity Interventions [facility]   10/11/23 0928 Pain Assessment right hip 2 - mild pain 2 - mild pain position adjusted KL           Objective   OBJECTIVE     Start time:  0926  End time:  0941  Session Length: 15 min  Mode of Treatment: occupational therapy, co-treatment (co-treatment utilized to assist with d/c planning)    General Observations  Patient received upright, in chair. She was agreeable to therapy, no issues or concerns identified by nurse prior to session.      Precautions: fall, weight bearing, hip  Hip Precautions: no precautions needed  Extremity Weight-Bearing Status: right lower extremity  Right LE Weight-Bearing Status: weight-bearing as tolerated (WBAT)     Services  Do You Speak a Language Other Than English at Home?: no  Is an  Needed/Used?: No      OT Eval and Treat - 10/11/23 0926        Cognition    Orientation Status oriented x 4     Affect/Mental Status WFL     Follows Commands WFL     Cognitive Function WFL        Vision Assessment/Intervention    Vision Assessment corrective lenses for distance        Hearing Assessment    Hearing Status WFL        Sensory Assessment    Sensory Assessment sensation intact, upper extremities        Upper Extremity Assessment    UE Assessment ROM and Strength WFL        Mobility Belt    Mobility Belt Used for All Out of Bed Activity no     Reason Mobility Belt Not Used other (see comments)     Reason Mobility Belt Not Used Pt. independent        Sit/Stand Transfer    Surface chair with arm rests     [place] modified independence     Assistive Device  walker, front-wheeled     Transfer Comments Good safety and technique demonstrated        Toilet Transfer    Transfer Technique sit/stand     [place] modified independence     Assistive Device walker, front-wheeled;commode, 3-in-1        Shower/Tub Transfer    Transfer Type Shower     Transfer Technique step over, left entry     [place] modified independence     Assistive Device none   Simulated wall support utilized       Functional Mobility    Distance in therapy gym     Functional Mobility [place] modified independence     Assistive Device walker, front-wheeled     Functional Mobility Comments House-hold distance completed        Lower Body Dressing    Comment Pt. received today fully dressed. Pt. reports she dressed self this morning indepenently. Education provided on potential benefit of reacher in relation to dressing tasks. Pt. reports to own a reacher        Balance    Static Sitting Balance WFL     Dynamic Sitting Balance WFL     Sit to Stand Dynamic Balance WFL     Static Standing Balance WFL;supported     Dynamic Standing Balance WFL;supported     Balance Interventions occupation based/functional task     Comment, Balance No LOB with use of RW        Impairments/Safety Issues    Impairments Affecting Function pain                      Functional Reach Test  Trial One: Functional Reach Test (in): 10.5 inches  Trial Two: Functional Reach Test (in): 11 inches  Average (in): 10.75 inches        Education Documentation  Treatment Plan, taught by Chucky Hair OT at 10/11/2023  2:26 PM.  Learner: Patient  Readiness: Acceptance  Method: Explanation, Demonstration  Response: Verbalizes Understanding, Demonstrated Understanding  Comment: Pt. educated on OT role/POC, transfer techniques/safe AD utilization, dressing techniques and discharge recommendations.        Session Outcome  Patient upright, in chair at end of session, all needs met, returned to room by therapy aide. Nursing notified about  patient's performance.    AM-PAC - ADL (Current Function)     Putting on/taking off regular lower body clothing 4 - None   Bathing 4 - None   Toileting 4 - None   Putting on/taking off regular upper body clothing 4 - None   Help for taking care of personal grooming 4 - None   Eating meals 4 - None   AM-PAC ADL Score 24      ASSESSMENT AND PLAN     Progress Summary  Pt. seen for OT evaluation s/p R RACHEL. At baseline pt. is fully independent. Upon evaluation today pt. completed functional mobility with RW with Mod I, ADL related transfers with Mod I and dressing routines with Mod I. No further skilled OT indicated in this setting. Recommend d/c home when medically cleared.    Patient/Family Therapy Goal Statement: Return home    OT Plan    Flowsheet Row Most Recent Value   Therapy Frequency evaluation only at 10/11/2023 0926          OT Discharge Recommendations    Flowsheet Row Most Recent Value   OT Recommended Discharge Disposition home with assistance  [PRN IADL support] at 10/11/2023 0926   Anticipated Equipment Needs At Discharge (OT) none at 10/11/2023 0926

## 2023-10-11 NOTE — PLAN OF CARE
Problem: Adult Inpatient Plan of Care  Goal: Plan of Care Review  Flowsheets (Taken 10/10/2023 0780)  Outcome Evaluation: The pt is a pleasant 68 year-old female who presents to physical therapy on post-op day #0 status post R RACHEL. Upon assessment, she demonstrated reduced R hip flexor strength, an impaired gait, reduced endurance, and decreased independence with mobility. Minimum assistance provided to perform bed mobility on today's date, with close supervision given to complete sit <-> stand, commode transfer, and moderate-distance ambulation trial (with rolling walker). Despite the use of increased time, the pt demonstrated appropriate safety awareness. She is extremely motivated to regain her pre-op degree of function, as she was independent with mobility/ADL's pre-op. She will be returning to a 1st-floor setup (in her home) at discharge, and will have support available from multiple family members at said time. Pending anticipated progress, recommend return to home environment with assistance (with physical assist for stair negotiation without railing) from family once medically stable.  Plan of Care Reviewed With:   patient   family

## 2023-10-11 NOTE — PLAN OF CARE
Care Coordination Discharge Plan Summary    Admission Assessment Summary    General Information  Readmission Within the last 30 days: no previous admission in last 30 days  Does patient have a :    Patient-Specific Goals (include timeframe): To go home    Living Arrangements  Arrived From:    Current Living Arrangements: home  People in Home: spouse  Home Accessibility: stairs within home (Group), stairs to enter home (Group)  Living Arrangement Comments: Lives w/ spouse in 2 story home w/ 2 steps to enter. Has 1st floor powder room. Full flight of steps to bedroom and bath. Plans to go to their [facility] in [place], [place] at discharge. They have 3 steps to enter and 1st floor setup. Their daughter will be helping her as well.    Social Determinants of Health - Screenings  Housing Stability  In the last 12 months, was there a time when you were not able to pay the mortgage or rent on time?: No  In the last 12 months, how many places have you lived?: 1  In the last 12 months, was there a time when you did not have a steady place to sleep or slept in a shelter (including now)?: No  Financial Resource Strain  How hard is it for you to pay for the very basics like food, housing, medical care, and heating?: Not hard at all  Transportation Needs  In the past 12 months, has lack of transportation kept you from medical appointments or from getting medications?: No  In the past 12 months, has lack of transportation kept you from meetings, work, or from getting things needed for daily living?: No    Functional Status Prior to Admission  Assistive Device/Animal Currently Used at Home:  (has rolling walker and commode; (+) grab bar by toilet; will be obtaining straight cane)  Functional Status Comments: Independent at baseline.  IADL Comments: Independent at baseline. Family available to assist. She drives.    Discharge Needs Assessment    Concerns to be Addressed: discharge planning  Current Discharge  Risk: physical impairment  Anticipated Changes Related to Illness: none    Discharge Plan Summary    Patient Choice  Offered/Gave Vendor List: no  Patient's Choice of Community Agency(s): N/A       Concerns / Comments: CC met w/ patient, spouse, and daughter Melissa in room. Anticipated discharge plan is to go to their [facility] home w/ 1st floor setup and no additional services. Family to drive her. Preferred pharmacy for discharge medications [id number] [address]  .  Discharge Plan:  Disposition/Destination: Home  / Home       Connection to Community  Not applicable    Community Resources:      Discharge Transportation:  Is Out of Hospital DNR needed at Discharge: no  Does patient need discharge transport? No         10/11/23 Pt cleared for dc to home today. Cleared by  PT/OT. Status post right total hip arthroplasty.  WBAT R LE. No hip precautions. PO asa. See home   set up above. L/w spouse, assist of family at home as needed.   Pt with ride home. No additional needs at this time.

## 2023-10-11 NOTE — PROGRESS NOTES
Physical Therapy -  Daily Treatment/Progress Note     Patient: Karina Talavera  Location: [place] [address]  MRN: [id number]  Today's date: 10/11/2023    HISTORY OF PRESENT ILLNESS     Karina is a 68 y.o. female admitted on 10/10/2023 with Osteoarthritis of right hip, unspecified osteoarthritis type [M16.11]  Osteoarthritis [M19.90]. Principal problem is OA of R hip.    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    History of Present Illness   Pt underwent R RACHEL with Dr. Boyer on 10/10/2023; WBAT R LE. No hip precautions per orders.    PRIOR LEVEL OF FUNCTION AND LIVING ENVIRONMENT     Prior Level of Function    Flowsheet Row Most Recent Value   Prior Level of Function Comment Pt was independent with ambulation in absence of assistive device. Dressed, bathed, and toileted without assistance. (+) . 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   Assistive Device Currently Used at Home --  [has rolling walker and commode,  (+) grab bar by toilet,  will be obtaining straight cane]        Prior Living Environment    Flowsheet Row Most Recent Value   People in Home spouse   Current Living Arrangements home   Home Accessibility stairs within home (Group), stairs to enter home (Group)   Living Environment Comment Pt will be returning to her 2-story summer home (in [place]) at discharge. Pt will have 1st-floor setup. 3 steps (no railing) to enter. Tub/shower on 1st floor, as well as outdoor walk-in shower (pt will bathe in latter). (+) grab bar adjacent to toilet on 1st floor. Pt will have initial support from her daughter for ~5 days post-discharge,  spouse will be present to provide assistance after said time period.        VITALS AND PAIN     PT Vitals    Date/Time Pulse SpO2 Pt Activity O2  Therapy BP BP Location BP Method Pt Position [facility]   10/11/23 0928 99 100 % At rest None (Room air) 118/59 Left upper arm Automatic Sitting KL      PT Pain    Date/Time Pain Type Side/Orientation Location Rating: Rest Rating: Activity Interventions [facility]   10/11/23 0928 Pain Assessment right hip 2 - mild pain 2 - mild pain position adjusted KL           Objective   OBJECTIVE     Start time:  0928  End time:  0940  Session Length: 12 min  Mode of Treatment: co-treatment, physical therapy (co-tx to expedite DC)    General Observations  Patient received upright, in chair, in therapy gym. She was agreeable to therapy, no issues or concerns identified by nurse prior to session.      Precautions: fall, weight bearing, hip  Hip Precautions: no precautions needed  Extremity Weight-Bearing Status: right lower extremity  Right LE Weight-Bearing Status: weight-bearing as tolerated (WBAT)           PT Eval and Treat - 10/11/23 0928        Cognition    Orientation Status oriented x 4        Bed Mobility    Comment pt stated she feels fine and does not feel like she needs to practice        Mobility Belt    Mobility Belt Used for All Out of Bed Activity no     Reason Mobility Belt Not Used other (see comments)     Reason Mobility Belt Not Used pt independent        Sit/Stand Transfer    Surface chair with arm rests     [place] modified independence     Assistive Device walker, front-wheeled        Car Transfer    Transfer Technique sit-stand;stand-sit     [place] modified independence     Assistive Device walker, front-wheeled        Gait Training    [place], Gait modified independence     Assistive Device walker, front-wheeled     Distance in Feet 120 feet     Pattern step-through     Comment (Gait/Stairs) 30' X 2 RW mod I, 120' X 1 RW mod I        Stairs Training    [place], Stairs modified independence     Safety/Cues increased time to complete     Assistive Device other (see comments)   braced hand    Handrail  "Location (Stairs) none     Number of Stairs 4     Stair Height 5 inches     Ascending Stairs Technique step-to-step     Descending Stairs Technique step-to-step     Comment pt stated she has 3 stairs to enter without a railing, pt able to ascend stairs with braced therapist hand, pt stated her daughter could due this for her        Balance    Static Sitting Balance WFL     Dynamic Sitting Balance WFL     Sit to Stand Dynamic Balance WFL     Static Standing Balance WFL     Dynamic Standing Balance WFL        Lower Extremity (Therapeutic Exercise)    Comment educated pt on glut sets and AP's                       10 Meter Walk Test (Self-Selected Velocity)  Trial One: Ten Meter Walk Test (sec): 14.22 seconds  Trial Two: Ten Meter Walk Test (sec): 13.69 seconds  Trial One: Gait Speed (m/s): 0.42 m/s  Trial Two: Gait Speed (m/s): 0.44 m/s  Average Gait Speed (m/s): Two Trials: 0.43 m/s          Session Outcome  Patient upright, in chair at end of session, all needs met, personal items in reach, call light in reach. Nursing notified about patient's performance, patient's position, and patient's response to therapy/activity.    AM-PAC - Mobility (Current Function)     Turning form your back to your side while in flat bed without using bedrails 4 - None   Moving from lying on your back to sitting on the side of a flat bed without using bedrails 4 - None   Moving to and from a bed to a chair 4 - None   Standing up from a chair using your arms 4 - None   To walk in a hospital room 4 - None   Climbing 3-5 steps with a railing 4 - None   AM-PAC Mobility Score 24      ASSESSMENT AND PLAN     Progress Summary  pt independent with fxn mobility using a RW, pt stated her daughter will be staying with her and will be able to help if needed, pt stated she has a RW at home and that she has a SPC that's ordered and on it's way, pt with no questions or concerns by end of tx    Patient/Family Therapy Goals Statement: \"To go home " "tomorrow\"    Reason for Discharge: no further needs identified    PT Discharge Recommendations    Flowsheet Row Most Recent Value   PT Recommended Discharge Disposition home with assistance  [pending progress,  minimum assistance for stair negotiation without railing] at 10/10/2023 1542   Anticipated Equipment Needs at Discharge (PT) none at 10/11/2023 0928               PT Goals    Flowsheet Row Most Recent Value   Bed Mobility Goal 1    Activity/Assistive Device sit to supine/supine to sit at 10/10/2023 1542   [place] modified independence at 10/10/2023 1542   Time Frame other (see comments)  [pt stated she feels fine with the bed and did not feel the need to practice] at 10/11/2023 0928   Progress/Outcome goal ongoing at 10/10/2023 1542   Transfer Goal 1    Activity/Assistive Device sit-to-stand/stand-to-sit, walker, front-wheeled at 10/10/2023 1542   [place] modified independence at 10/10/2023 1542   Time Frame 1 week at 10/10/2023 1542   Progress/Outcome goal met at 10/11/2023 0928   Transfer Goal 2    Activity/Assistive Device car transfer, walker, front-wheeled at 10/10/2023 1542   [place] supervision required at 10/10/2023 1542   Time Frame 1 week at 10/10/2023 1542   Progress/Outcome goal met at 10/11/2023 0928   Gait Training Goal 1    Activity/Assistive Device gait (walking locomotion), walker, front-wheeled at 10/10/2023 1542   [place] modified independence at 10/10/2023 1542   Distance 120 feet at 10/10/2023 1542   Time Frame 1 week at 10/10/2023 1542   Progress/Outcome goal met at 10/11/2023 0928   Stairs Goal 1    Activity/Assistive Device ascending stairs, descending stairs, cane, straight  [in absence of railing] at 10/10/2023 1542   [place] minimum assist (75% or more patient effort) at 10/10/2023 1542   Number of Stairs 3 at 10/10/2023 1542   Time Frame 1 week at 10/10/2023 1542   Progress/Outcome goal met at 10/11/2023 0928        "

## 2023-10-11 NOTE — PLAN OF CARE
Pt. seen for OT evaluation s/p R RACHEL. At baseline pt. is fully independent. Upon evaluation today pt. completed functional mobility with RW with Mod I, ADL related transfers with Mod I and dressing routines with Mod I. No further skilled OT indicated in this setting. Recommend d/c home when medically cleared.

## 2023-10-11 NOTE — PLAN OF CARE
Problem: Adult Inpatient Plan of Care  Goal: Plan of Care Review  Outcome: Progressing  Flowsheets (Taken 10/11/2023 0153)  Progress: improving  Plan of Care Reviewed With: patient  Goal: Optimal Comfort and Wellbeing  Outcome: Progressing  Goal: Readiness for Transition of Care  Outcome: Progressing     Problem: Hip Arthroplasty  Goal: Intact Neurovascular Status  Outcome: Progressing  Goal: Acceptable Pain Control  Outcome: Progressing  Goal: Effective Urinary Elimination  Outcome: Progressing

## 2023-10-11 NOTE — PROGRESS NOTES
Pt seen & examined. Pt reports pain controlled. No complaints Denies CP, SOB, N&V.     A+Ox3, AVSS, NAD  QUAD/DF/PF/EHL intact, + DP/PT b/l, capillary refill brisk, SILT, compartments soft, nontender  Right aquacell dressing clean, dry and intact with expected postop edema     Hg 9.1    A/P: s/p Procedure(s) (LRB):  Right total hip replacement (Right) POD #1d/w Attending  DVT ppx: multimodal- SCDS & aspirin 81 mg po bid x 7 days then home Eliquis  Pain management  Bowel regimen  WBAT  PT/OT   Anemia/drop in hemoglobin due to acute blood loss, expected from surgery  Consult: medical management HMS, cardiac management CCP  Dispo planning: d/c when stable per PT/OT/Cards/Medicine home today

## 2023-10-11 NOTE — PROGRESS NOTES
"    SUBJECTIVE   Interval History: Patient sitting in chair, in no acute distress.  She denies any complaints.  She tells me pain is currently controlled.  She was cleared by PT, stable for discharge.  She is medically stable for discharge from [facility] standpoint.  She states that at baseline her blood pressure runs low.  Denies any lightheadedness.      OBJECTIVE      Vital signs in last 24 hours:  Temp:  [36.4 °C (97.5 °F)-36.7 °C (98.1 °F)] 36.7 °C (98.1 °F)  Heart Rate:  [] 99  Resp:  [12-30] 16  BP: ()/(44-76) 118/59      Intake/Output Summary (Last 24 hours) at 10/11/2023 1108  Last data filed at 10/10/2023 1300  Gross per 24 hour   Intake --   Output 550 ml   Net -550 ml         PHYSICAL EXAMINATION      General: Well appearing, NAD  HEENT: Moist membrane, PERRL, anicteric sclera   Neck: supple, no JVD  Cardiac: Irregularly irregular  Lungs: clear bilaterally, no wheezing/rales/rhonchi  Abdomen: soft, NT/ND, +BS, no rebound/guarding   Extremities: Hip dressing in place  Neuro: AAOx3, nonfocal  Skin: warm, well perfused  Psych: cooperative, appropriate      LINES, CATHETERS, DRAINS, AIRWAYS, AND WOUNDS   Lines, Drains, Airways, Wounds:  Surgical Incision Hip Right (Active)   Number of days: 1       Comments:          LABS / IMAGING / TELE   Labs:  Potassium 4.3, creatinine 0.6, hemoglobin 9.1    No results found for: \"COVID19\"     Radiology:  Significant findings include: Reviewed postop hip x-ray  See radiology report for details  IMPRESSION: Status post right hip arthroplasty expected postop changes.    ECG/Telemetry:  Not applicable, MedSurg      ASSESSMENT AND PLAN     Primary osteoarthritis of right hip  Assessment & Plan  -Status post right total hip arthroplasty by Dr. Boyer on 10/10  -Continue pain control combination of Tylenol, oxycodone and morphine  -Defer DVT prophylaxis to orthopedic team with aspirin twice daily.  Can transition back to Eliquis after 7 days of aspirin  -Add stool " softeners to prevent immobility/opioid-induced constipation  -PT/OT evaluation and treatment    Acute anemia  Assessment & Plan  -Baseline hemoglobin typically ranges between 12 and 13 from review of care everywhere  -Found to have anemia with hemoglobin in the 9s, likely expected surgical blood loss and acute distress  -No indication to transfuse at this time  -Check iron profile, B12, folate    Dyslipidemia  Assessment & Plan  -Continue pravastatin    Permanent atrial fibrillation (CMS/HCC)  Assessment & Plan  Chronic atrial fibrillation, rate controlled  -Continue amiodarone.  Adjust metoprolol per BP parameters  -Orthopedic surgery recommends hold on Eliquis.  7 days of aspirin twice daily, then transition back to Eliquis    Hypertrophic cardiomyopathy (CMS/[facility])  Assessment & Plan  History of hypertrophic cardiomyopathy.  -Blood pressure stabilized.  Adjust metoprolol based on blood pressure parameters          VTE Assessment: [place] VTE Score: 5   I have reassessed and the patient's VTE risk and treatment plan is appropriate.  VTE prophylaxis: Aspirin twice daily.  Transition back to Eliquis 7 days afterwards    CODE STATUS: Full Code    Expected Discharge Date:  10/11/2023        Disposition Planning: Medically stable for discharge from [facility] standpoint    PDMP: I have queried and reviewed the state Prescription Drug Monitoring Program [PDMP]    Discussed with Consultant: Orthopedic surgery, nurse    Discussed with Family: Daughter, present at the bedside      Niall Truong DO  10/11/23 11:08 AM    Speech recognition software was used to create this note. Please disregard any inadvertent translation errors. Please call the office for any clarifications.

## 2023-10-11 NOTE — ASSESSMENT & PLAN NOTE
-Baseline hemoglobin typically ranges between 12 and 13 from review of care everywhere  -Found to have anemia with hemoglobin in the 9s, likely expected surgical blood loss and acute distress  -No indication to transfuse at this time  -Check iron profile, B12, folate

## 2023-10-11 NOTE — PROGRESS NOTES
Physical Therapy -  Initial Evaluation     Patient: Karina Talavera  Location: [place] [address]  MRN: [id number]  Today's date: 10/10/2023    HISTORY OF PRESENT ILLNESS     Karina is a 68 y.o. female admitted on 10/10/2023 with Osteoarthritis of right hip, unspecified osteoarthritis type [M16.11]  Osteoarthritis [M19.90]. Principal problem is OA of R hip.    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    History of Present Illness   Pt underwent R RACHEL with Dr. Boyer on 10/10/2023; WBAT R LE. No hip precautions per orders.    PRIOR LEVEL OF FUNCTION AND LIVING ENVIRONMENT     Prior Level of Function    Flowsheet Row Most Recent Value   Prior Level of Function Comment Pt was independent with ambulation in absence of assistive device. Dressed, bathed, and toileted without assistance. (+) . 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   Assistive Device Currently Used at Home --  [has rolling walker and commode,  (+) grab bar by toilet,  will be obtaining straight cane]        Prior Living Environment    Flowsheet Row Most Recent Value   People in Home spouse   Current Living Arrangements home   Home Accessibility stairs within home (Group), stairs to enter home (Group)   Living Environment Comment Pt will be returning to her 2-story summer home (in [place]) at discharge. Pt will have 1st-floor setup. 3 steps (no railing) to enter. Tub/shower on 1st floor, as well as outdoor walk-in shower (pt will bathe in latter). (+) grab bar adjacent to toilet on 1st floor. Pt will have initial support from her daughter for ~5 days post-discharge,  spouse will be present to provide assistance after said time period.        VITALS AND PAIN     PT Vitals    Date/Time Pulse SpO2 Pt Activity O2 Therapy BP  Pt Position [facility]   10/10/23 1542 96 99 % At rest None (Room air) 99/57 Lying CC   10/10/23 1618 94 99 % At rest None (Room air) 90/51 Sitting edge of bed post-ambulation; (+) reports of headache in sitting, with improvement in said symptom when returned to supine CC      PT Pain    Date/Time Pain Type Side/Orientation Location Rating: Rest Rating: Activity Interventions [facility]   10/10/23 1542 Pain Assessment right hip 1 3 position adjusted;cold applied CC           Objective   OBJECTIVE     Start time:  1542  End time:  1626  Session Length: 44 min  Mode of Treatment: individual therapy, physical therapy    General Observations  Patient received supine, in bed (pt's spouse and daughter at bedside). She was agreeable to therapy, no issues or concerns identified by nurse prior to session.      Precautions: fall, weight bearing, hip  Hip Precautions: no precautions needed  Extremity Weight-Bearing Status: right lower extremity  Right LE Weight-Bearing Status: weight-bearing as tolerated (WBAT)           PT Eval and Treat - 10/10/23 1542        Cognition    Orientation Status oriented x 4     Affect/Mental Status WFL     Follows Commands WFL     Cognitive Function WFL     Comment, Cognition Pleasant and cooperative throughout encounter        Vision Assessment/Intervention    Vision Assessment corrective lenses for distance   intermittently       Hearing Assessment    Hearing Status WFL        Sensory Assessment    Sensory Assessment sensation intact, lower extremities   grossly intact to light touch throughout bilateral LE's       Lower Extremity Assessment    LE Assessment ROM and Strength WFL except        Lower Extremity Range of Motion    Hip, Right (ROM) Active flexion to ~90 degrees in sitting (edge of bed)        Lower Extremity Strength    Hip, Left (Strength) Flexion > 3/5 (able to perform straight leg raise against gravity in supineE)     Ankle, Left (Strength) Dorsiflexion 5/5     Hip, Right (Strength) Flexion  2+/5     Knee, Right (Strength) Extension > 3/5 (resistance not applied post-op)     Ankle, Right (Strength) Dorsiflexion 5/5        Bed Mobility    Bed Mobility Activities supine to sit;sit to supine     [place] minimum assist (75% or more patient effort)     Comment Head of bed elevated for supine -> sit; physical assist for L LE mobility; out of bed to L side        Mobility Belt    Mobility Belt Used for All Out of Bed Activity yes        Sit/Stand Transfer    Surface --   x 1 from commode and x 1 to edge of bed    [place] close supervision     Safety/Cues technique;hand placement     Assistive Device walker, front-wheeled     Transfer Comments Slightly slow to rise; verbal cues for hand placement while rising; (+) reports of headache while sitting at edge of bed post-ambulation (blood pressure 90/51 -> pt returned to supine and RN made aware regarding same)        Toilet Transfer    Transfer Technique stand pivot     [place] close supervision     Assistive Device walker, front-wheeled     Comment Edge of bed -> commode (pt requesting to urinate); (-) reports of dizziness/lightheadedness during transition        Gait Training    [place], Gait close supervision     Safety/Cues proper use of assistive device;gait deviations     Assistive Device walker, front-wheeled     Distance in Feet 60 feet     Comment (Gait/Stairs) Step-to pattern during initial ambulation, progressing to step-over-step; (-) losses of balance; increased time utilized during turns; progressive increase in speed over course of trial        Balance    Static Sitting Balance WFL;sitting, edge of bed     Sit to Stand Dynamic Balance mild impairment;supported     Static Standing Balance mild impairment;supported     Dynamic Standing Balance mild impairment;supported     Comment, Balance Standing balance assessed with use of rolling walker        Impairments/Safety Issues    Impairments Affecting Function  balance;endurance/activity tolerance;pain;strength                               Education Documentation  Treatment Plan, taught by Erich Gatica PT at 10/10/2023 11:27 PM.  Learner: Patient  Readiness: Acceptance  Method: Explanation  Response: Verbalizes Understanding  Comment: Therapist educated pt on role of physical therapy in performance of mobility assessment for discharge planning purposes/fall risk evaluation        Session Outcome  Patient upright, in bed (bilateral SCD's re-applied; pt's spouse, daughter, and friend present at bedside) at end of session, bed alarm on, all needs met, call light in reach, personal items in reach. Nursing notified about patient's performance, change in vital signs, patient's position, and patient's response to therapy/activity.    AM-PAC - Mobility (Current Function)     Turning form your back to your side while in flat bed without using bedrails 3 - A Little   Moving from lying on your back to sitting on the side of a flat bed without using bedrails 3 - A Little   Moving to and from a bed to a chair 3 - A Little   Standing up from a chair using your arms 3 - A Little   To walk in a hospital room 3 - A Little   Climbing 3-5 steps with a railing 3 - A Little   AM-PAC Mobility Score 18      ASSESSMENT AND PLAN     Progress Summary  The pt is a pleasant 68 year-old female who presents to physical therapy on post-op day #0 status post R RACHEL. Upon assessment, she demonstrated reduced R hip flexor strength, an impaired gait, reduced endurance, and decreased independence with mobility. Minimum assistance provided to perform bed mobility on today's date, with close supervision given to complete sit <-> stand, commode transfer, and moderate-distance ambulation trial (with rolling walker). Despite the use of increased time, the pt demonstrated appropriate safety awareness. She is extremely motivated to regain her pre-op degree of function, as she was independent with  "mobility/ADL's pre-op. She will be returning to a 1st-floor setup (in her home) at discharge, and will have support available from multiple family members at said time. Pending anticipated progress, recommend return to home environment with assistance (with physical assist for stair negotiation without railing) from family once medically stable.    Patient/Family Therapy Goals Statement: \"To go home tomorrow\"    PT Plan    Flowsheet Row Most Recent Value   Rehab Potential good, to achieve stated therapy goals at 10/10/2023 1542   Therapy Frequency 6 times/wk at 10/10/2023 1542   Planned Therapy Interventions balance training, gait training, transfer training, bed mobility training, stair training, patient/family education at 10/10/2023 1542          PT Discharge Recommendations    Flowsheet Row Most Recent Value   PT Recommended Discharge Disposition home with assistance  [pending progress,  minimum assistance for stair negotiation without railing] at 10/10/2023 1542   Anticipated Equipment Needs at Discharge (PT) cane, straight  [? commomde,  pt's personal rolling walker was adjusted for pt's height on today's date] at 10/10/2023 1542               PT Goals    Flowsheet Row Most Recent Value   Bed Mobility Goal 1    Activity/Assistive Device sit to supine/supine to sit at 10/10/2023 1542   [place] modified independence at 10/10/2023 1542   Time Frame 1 week at 10/10/2023 1542   Progress/Outcome goal ongoing at 10/10/2023 1542   Transfer Goal 1    Activity/Assistive Device sit-to-stand/stand-to-sit, walker, front-wheeled at 10/10/2023 1542   [place] modified independence at 10/10/2023 1542   Time Frame 1 week at 10/10/2023 1542   Progress/Outcome goal ongoing at 10/10/2023 1542   Transfer Goal 2    Activity/Assistive Device car transfer, walker, front-wheeled at 10/10/2023 1542   [place] supervision required at 10/10/2023 1542   Time Frame 1 week at 10/10/2023 1542   Progress/Outcome goal ongoing at " 10/10/2023 1542   Gait Training Goal 1    Activity/Assistive Device gait (walking locomotion), walker, front-wheeled at 10/10/2023 1542   [place] modified independence at 10/10/2023 1542   Distance 120 feet at 10/10/2023 1542   Time Frame 1 week at 10/10/2023 1542   Progress/Outcome goal ongoing at 10/10/2023 1542   Stairs Goal 1    Activity/Assistive Device ascending stairs, descending stairs, cane, straight  [in absence of railing] at 10/10/2023 1542   [place] minimum assist (75% or more patient effort) at 10/10/2023 1542   Number of Stairs 3 at 10/10/2023 1542   Time Frame 1 week at 10/10/2023 1542   Progress/Outcome goal ongoing at 10/10/2023 1542

## 2023-10-11 NOTE — ASSESSMENT & PLAN NOTE
With hypertrophic cardiomyopathy, may need blood but will defer to HMS. Blood pressure on the lower side.

## 2023-10-17 NOTE — OP NOTE
MAIN LINE [facility]   OPERATIVE REPORT      PATIENT NAME:  CRESCENCIO OLMSTEAD  MR#:    [id number]  VISIT #:   [id number]  :    1955  ADMIT DATE:   10/10/2023  PROCEDURE DATE: 10/10/2023  ROOM NUMBER:    OPERATION: TOTAL HIP ARTHROPLASTY  PREOPERATIVE DIAGNOSIS: Primary osteoarthritis right hip  POSTOPERATIVE DIAGNOSIS: Primary osteoarthritis right hip  ANESTHESIA: Spinal   SURGEON: Scout Boyer M.D.     ASSISTANT: Suma Herrera NP     EBL:   Specimens: If required by hospital rules, resected tissue was sent for routine analysis.     PROCEDURE:  After the induction of anesthesia, the patient was routinely prepped and draped for total hip arthroplasty.  Dr Scout Boyer performed and/or supervised the key portions of the surgical procedure including bone preparation, insertion of the provisional and final components and assessment of range of motion, joint stability, and leg length.  Following draping, the right hip was exposed and the hip was dislocated.  Severe joint deterioration was noted. The acetabulum was prepared to accept a 52 acetabular shell.  The shell was impacted into place and a firm press fit was obtained.  A 36 liner was then inserted into the shell.  The femoral canal was prepared to accept a 7 lateral femoral stem.  Trial reduction was performed.  The final stem was impacted into place and firm fixation was obtained.  Again, trial reductions were performed.  A standard ceramic femoral head was impacted onto the femoral stem and a final reduction was performed.  After trial and final reduction, the hip was checked for leg length, stability, and range of motion.  After final reduction, the wound was  closed in layers and a sterile dressing was applied.      Corentec M Stem components were utilized for this procedure.     I performed and/or supervised the key portions of the surgical procedure including an evaluation of the bone cuts, reshaping of the bony elements, insertion of  the provisional and final components, and  assessment of range of motion and stability    A surgical assistant was required during this procedure. This assistant facilitated the safe and competent performance of the surgical operation. The assistant functions included retraction, vascular coagulation, removal of resected bone elements, suction, wound irrigation, and wound closure. The presence of this assistant during the procedure was essential.     I certify that the services for which payment is claimed were medically necessary and that no qualified resident or fellow was available to perform the services.

## 2024-03-04 ENCOUNTER — OFFICE VISIT (OUTPATIENT)
Dept: URGENT CARE | Facility: MEDICAL CENTER | Age: 69
End: 2024-03-04
Payer: COMMERCIAL

## 2024-03-04 VITALS
OXYGEN SATURATION: 99 % | DIASTOLIC BLOOD PRESSURE: 60 MMHG | TEMPERATURE: 98.7 F | SYSTOLIC BLOOD PRESSURE: 124 MMHG | HEART RATE: 71 BPM

## 2024-03-04 DIAGNOSIS — H10.9 CONJUNCTIVITIS OF RIGHT EYE, UNSPECIFIED CONJUNCTIVITIS TYPE: Primary | ICD-10-CM

## 2024-03-04 PROCEDURE — 99213 OFFICE O/P EST LOW 20 MIN: CPT | Performed by: FAMILY MEDICINE

## 2024-03-04 RX ORDER — TOBRAMYCIN 3 MG/ML
1 SOLUTION/ DROPS OPHTHALMIC
Qty: 1.3 ML | Refills: 0 | Status: SHIPPED | OUTPATIENT
Start: 2024-03-04 | End: 2024-03-09

## 2024-03-04 RX ORDER — MAVACAMTEN 5 MG/1
5 CAPSULE, GELATIN COATED ORAL DAILY
COMMUNITY
Start: 2023-10-02 | End: 2024-04-03

## 2024-03-04 RX ORDER — AMIODARONE HYDROCHLORIDE 200 MG/1
1 TABLET ORAL DAILY
COMMUNITY
Start: 2024-02-26

## 2024-03-04 RX ORDER — AMOXICILLIN 500 MG/1
CAPSULE ORAL
COMMUNITY
Start: 2024-01-11

## 2024-03-05 NOTE — PROGRESS NOTES
[facility]        NAME: Akua Winn is a 68 y.o. female  : 1955    MRN: [id number]  DATE: 2024  TIME: 7:44 PM    Assessment and Plan   Conjunctivitis of right eye, unspecified conjunctivitis type [H10.9]  1. Conjunctivitis of right eye, unspecified conjunctivitis type  tobramycin (TOBREX) 0.3 % SOLN            Patient Instructions       Follow up with PCP in 3-5 days.  Proceed to  ER if symptoms worsen.    If tests have been performed at [facility], our office will contact you with results if changes need to be made to the care plan discussed with you at the visit.  [facility]    Chief Complaint     Chief Complaint   Patient presents with    Eye Problem     Patient c/o right eye redness with discharge that started today          History of Present Illness       68-year-old female here today with complaint of right eye redness that began this morning.  As the day progressed, she noticed some mucopurulent discharge and irritation describes gritty feeling.  Denies any sick contact.  However she recently saw her eye doctor this weekend who gave her a new prescription contact lens.  She felt she was having blurry vision and took it out.  Decided to come to urgent care for evaluation.    Eye Problem   Associated symptoms include an eye discharge and eye redness.       Review of Systems   Review of Systems   Eyes:  Positive for discharge, redness and visual disturbance.         Current Medications       Current Outpatient Medications:     amiodarone 200 mg tablet, Take 1 tablet by mouth daily, Disp: , Rfl:     amoxicillin (AMOXIL) 500 mg capsule, TAKE 4 CAPSULES (2000MG) ONCE, ONE HOUR PRIOR TO DENTAL PROCEDURES, Disp: , Rfl:     apixaban (Eliquis) 5 mg, TAKE 1 TABLET (5 MG TOTAL) BY MOUTH 2 TIMES A DAY TWICE DAILY, Disp: , Rfl:     Camzyos 5 MG CAPS, Take 5 mg by mouth daily, Disp: , Rfl:     metoprolol tartrate (LOPRESSOR) 25 mg tablet, Take 25 mg  by mouth 2 (two) times a day, Disp: , Rfl:     tobramycin (TOBREX) 0.3 % SOLN, Administer 1 drop to the right eye every 4 (four) hours while awake for 5 days, Disp: 1.3 mL, Rfl: 0    metoprolol succinate (TOPROL-XL) 25 mg 24 hr tablet, Take 12.5 mg by mouth, Disp: , Rfl:     pravastatin (PRAVACHOL) 20 mg tablet, Take 20 mg by mouth, Disp: , Rfl:     Current Allergies     Allergies as of 03/04/2024    (No Known Allergies)            The following portions of the patient's history were reviewed and updated as appropriate: allergies, current medications, past family history, past medical history, past social history, past surgical history and problem list.     Past Medical History:   Diagnosis Date    Basal cell carcinoma     Right lowere rayna       Past Surgical History:   Procedure Laterality Date    MOHS SURGERY      Right lower eyelid    SKIN BIOPSY         Family History   Problem Relation Age of Onset    Basal cell carcinoma Mother     Basal cell carcinoma Father          Medications have been verified.        Objective   /60   Pulse 71   Temp 98.7 °F (37.1 °C)   SpO2 99%   No LMP recorded.       Physical Exam     Physical Exam  Vitals and nursing note reviewed.   Eyes:      Extraocular Movements: Extraocular movements intact.      Pupils: Pupils are equal, round, and reactive to light.      Comments: Right eye reveals injected sclera and conjunctiva with mucopurulent discharge   Neurological:      Mental Status: She is alert.

## 2024-03-05 NOTE — PATIENT INSTRUCTIONS
I prescribed tobramycin eyedrop 1 drop into right eye every 4 hours while awake for 5 days.  Recommended patient use eyelid scrubs as needed to prevent mucus discharge of the eyelids least twice a day.  Keep contact lens while undergoing treatment.    Conjunctivitis   WHAT YOU NEED TO KNOW:   Conjunctivitis, or pink eye, is inflammation of your conjunctiva. The conjunctiva is a thin tissue that covers the front of your eye and the back of your eyelid. The conjunctiva helps protect your eye and keep it moist. The most common cause of conjunctivitis is infection with bacteria or a virus. Allergies or exposure to a chemical may also cause conjunctivitis. Conjunctivitis is easily spread from person to person.       DISCHARGE INSTRUCTIONS:   Return to the emergency department if:   You have worsening eye pain.    The swelling in your eye gets worse, even after treatment.    Your vision suddenly becomes worse, or you cannot see at all.    Call your doctor if:   Your start to notice changes in your vision.    You develop a fever and ear pain.    You have tiny bumps or spots of blood on your eye.    You have questions or concerns about your condition or care.    Medicines:  You may need any of the following:  Allergy medicine  helps decrease itchy, red, swollen eyes caused by allergies. It may be given as a pill, eye drops, or nasal spray.    Antibiotics  may be needed if your conjunctivitis is caused by bacteria. This medicine may be given as a pill, eye drops, or eye ointment.    Take your medicine as directed.  Contact your healthcare provider if you think your medicine is not helping or if you have side effects. Tell your provider if you are allergic to any medicine. Keep a list of the medicines, vitamins, and herbs you take. Include the amounts, and when and why you take them. Bring the list or the pill bottles to follow-up visits. Carry your medicine list with you in case of an emergency.    Manage your symptoms:    Apply a cool compress.  Wet a washcloth with cold water and place it on your eye. This will help decrease itching and irritation.    Use artificial tears.  This will help lessen your symptoms, including itching or irritation.    Do not wear contact lenses  until treatment is complete and your symptoms are gone.    Flush your eye.  You may need to flush your eye with saline to help decrease your symptoms. Ask for more information on how to flush your eye.    Prevent the spread of conjunctivitis:   Wash your hands with soap and water often.  Wash your hands before and after you touch your eyes. Wash your hands after you use the bathroom, change a child's diaper, or sneeze. Wash your hands before you prepare or eat food.         Avoid contact with others.  Do not share towels or washcloths. Try to stay away from others as much as possible. Ask when you can return to work or school.    Avoid allergens and irritants.  Try to avoid the things that cause your allergies, such as pets, dust, or grass. Stay away from smoke filled areas. Shield your eyes from wind and sun.    Throw away eye makeup.  Bacteria can stay in eye makeup. Throw away your current mascara and other eye makeup. Never share mascara or other eye makeup with anyone.    Follow up with your doctor as directed:  You may be referred to an ophthalmologist for treatment. Write down your questions so you remember to ask them during your visits.  © Copyright [employer] 2023 Information is for End User's use only and may not be sold, redistributed or otherwise used for commercial purposes.  The above information is an  only. It is not intended as medical advice for individual conditions or treatments. Talk to your doctor, nurse or pharmacist before following any medical regimen to see if it is safe and effective for you.

## 2024-07-03 ENCOUNTER — OFFICE VISIT (OUTPATIENT)
Dept: DERMATOLOGY | Facility: CLINIC | Age: 69
End: 2024-07-03
Payer: COMMERCIAL

## 2024-07-03 VITALS — WEIGHT: 118 LBS | BODY MASS INDEX: 17.88 KG/M2 | HEIGHT: 68 IN | TEMPERATURE: 98.5 F

## 2024-07-03 DIAGNOSIS — D22.71 MULTIPLE BENIGN MELANOCYTIC NEVI OF UPPER AND LOWER EXTREMITIES AND TRUNK: ICD-10-CM

## 2024-07-03 DIAGNOSIS — D22.5 MULTIPLE BENIGN MELANOCYTIC NEVI OF UPPER AND LOWER EXTREMITIES AND TRUNK: ICD-10-CM

## 2024-07-03 DIAGNOSIS — D22.61 MULTIPLE BENIGN MELANOCYTIC NEVI OF UPPER AND LOWER EXTREMITIES AND TRUNK: ICD-10-CM

## 2024-07-03 DIAGNOSIS — L82.1 SEBORRHEIC KERATOSIS: ICD-10-CM

## 2024-07-03 DIAGNOSIS — D22.72 MULTIPLE BENIGN MELANOCYTIC NEVI OF UPPER AND LOWER EXTREMITIES AND TRUNK: ICD-10-CM

## 2024-07-03 DIAGNOSIS — L81.4 SOLAR LENTIGO: ICD-10-CM

## 2024-07-03 DIAGNOSIS — D18.01 CHERRY ANGIOMA: ICD-10-CM

## 2024-07-03 DIAGNOSIS — Z85.828 HISTORY OF BASAL CELL CARCINOMA: Primary | ICD-10-CM

## 2024-07-03 DIAGNOSIS — D22.62 MULTIPLE BENIGN MELANOCYTIC NEVI OF UPPER AND LOWER EXTREMITIES AND TRUNK: ICD-10-CM

## 2024-07-03 DIAGNOSIS — L56.8 ACTINIC CHEILITIS: ICD-10-CM

## 2024-07-03 PROCEDURE — 99214 OFFICE O/P EST MOD 30 MIN: CPT

## 2024-07-03 RX ORDER — FLUOROURACIL 50 MG/G
CREAM TOPICAL 2 TIMES DAILY
Qty: 40 G | Refills: 0 | Status: SHIPPED | OUTPATIENT
Start: 2024-07-03

## 2024-07-03 RX ORDER — MAVACAMTEN 5 MG/1
5 CAPSULE, GELATIN COATED ORAL DAILY
COMMUNITY
Start: 2024-06-14 | End: 2024-09-12

## 2024-07-03 NOTE — PATIENT INSTRUCTIONS
ACTINIC CHEILITIS     Assessment and Plan:  Based on a thorough discussion of this condition and the management approach to it (including a comprehensive discussion of the known risks, side effects and potential benefits of treatment), the patient (family) agrees to implement the following specific plan:  Start topical Fluorouracil cream twice a day for 2 weeks to the lower lip and pigmented right upper lip area.  To chest apply twice a day for 3 weeks.  Apply vaseline to lips prior to using efudex to avoid ingestion of topical medication  If scaling of lower lip does not resolve or pigmented actinic keratosis above right upper lip does not resolve within 4-6 weeks of efudex therapy please contact office to be re-evaluated and would consider biopsy.   Redness and irritation are to be expected during the course of treatment and while lesions are healing. Patient aware of expectations.    SEBORRHEIC KERATOSIS      Assessment and Plan:  Based on a thorough discussion of this condition and the management approach to it (including a comprehensive discussion of the known risks, side effects and potential benefits of treatment), the patient (family) agrees to implement the following specific plan:  Reassure benign  Use sun protection.  Apply SPF 30 or higher at least three times a day.  Wear sun protecting clothing and hats.  Discussed $150 to treat with LN2 up to 10 lesions if not bothersome   Discussed over the counter creams containaining gentle acids.    Amlactin's line of lotions (lactic acid containing)                      What is skin cancer?  Skin cancer is unfortunately very common. That's why we are here to help you on your journey to healthy happy skin! There are two main types of skin cancer: melanoma and non-melanoma skin cancer. Melanoma is a form of skin cancer that often arises within an existing nevus or mole. However, this is not always the case. Melanoma can arise anywhere (not only where you have  moles right now). Melanoma can run in families, so letting us know about your family history is important. Non-melanoma skin cancer is the most common type of cancer in the [place]. The two main types of non-melanoma skin cancers are basal cell carcinomas (BCC) and squamous cell carcinoma (SCC). These cancers tend to be less aggressive than melanomas but are still important to look for and treat.    What can I do to prevent skin cancer?  One of the largest risk factors for skin cancer is sun exposure or UV radiation. Therefore, sun protection is sellers! Here are some great tips for protecting yourself!  Try to avoid direct sun exposure during peak sun hours (10 AM to 2 PM)  Remember you get A LOT of sun even under cloud coverage and through care windows!  When choosing a sunscreen, look for one that says “broad spectrum” sunscreen. This means it protects you from more of the harmful UV rays.   Choose a sunscreen that is SPF 30 or greater for best protection.   Apply sunscreen to all sun-exposed skin and reapply every 2 hours.   Consider sun protective clothing! Great additions to your sun protective clothing wardrobe include broad brimmed hats, sunglasses, UPF clothing.  Avoid tanning salons. These have been shown to be very harmful in terms of your risk of skin cancer.   Avoid “base tans”. We now know that tans are dangerous (not just sun burns). If you want to have a tan for a trip, consider a spray tan!    Should I check my skin at home between my dermatology appointments?  Yes! It's always a great idea to look at your skin on a regular basis. Here are some things to look for when monitoring your skin.   For melanoma, look for the ABCDE's!  A = Asymmetry. Look for a spot where one half does not match the other!  B = Borders. Look for a spot that has jagged, ragged or irregular borders.  C = Color. Look for a spot that is not evenly colored and often includes multiple colors, especially true black, red, white,  blue, grey.   D = Diameter. Look for a spot that is larger than the size of a pencil eraser.  E = Evolution. If you ever have a spot that is changing in shape, color, size or symptoms (becomes itchy, painful or starts to bleed), always call us!  For non-melanoma skin cancers, look for a new, pink spot that is not going away, especially one that is itchy, painful or bleeding.     What should I do if I see a spot that is concerning for melanoma or non-melanoma skin cancer?  If you are ever concerned, call us! Do not wait for your next appointment. We want to help!

## 2024-07-03 NOTE — PROGRESS NOTES
"[facility] Note     Patient Name: Akua Winn  Encounter Date: 7/3/2024     Have you been cared for by a [facility] Dermatologist in the last 3 years and, if so, which description applies to you?    Yes.  I have been here within the last 3 years, and my medical history has NOT changed since that time.  I am FEMALE/of child-bearing potential.    REVIEW OF SYSTEMS:  Have you recently had or currently have any of the following? No changes in my recent health.   PAST MEDICAL HISTORY:  Have you personally ever had or currently have any of the following?  If \"YES,\" then please provide more detail. No changes in my medical history.   HISTORY OF IMMUNOSUPPRESSION: Do you have a history of any of the following:  Systemic Immunosuppression such as Diabetes, Biologic or Immunotherapy, Chemotherapy, Organ Transplantation, Bone Marrow Transplantation?  No     Answering \"YES\" requires the addition of the dotphrase \"IMMUNOSUPPRESSED\" as the first diagnosis of the patient's visit.   FAMILY HISTORY:  Any \"first degree relatives\" (parent, brother, sister, or child) with the following?    No changes in my family's known health.   PATIENT EXPERIENCE:    Do you want the Dermatologist to perform a COMPLETE skin exam today including a clinical examination under the \"bra and underwear\" areas?  Yes  If necessary, do we have your permission to call and leave a detailed message on your Preferred Phone number that includes your specific medical information?  Yes      No Known Allergies   Current Outpatient Medications:     amiodarone 200 mg tablet, Take 1 tablet by mouth daily, Disp: , Rfl:     amoxicillin (AMOXIL) 500 mg capsule, TAKE 4 CAPSULES (2000MG) ONCE, ONE HOUR PRIOR TO DENTAL PROCEDURES, Disp: , Rfl:     apixaban (Eliquis) 5 mg, TAKE 1 TABLET (5 MG TOTAL) BY MOUTH 2 TIMES A DAY TWICE DAILY, Disp: , Rfl:     metoprolol succinate (TOPROL-XL) 25 mg 24 hr tablet, Take 12.5 mg by mouth, Disp: , Rfl:     metoprolol " tartrate (LOPRESSOR) 25 mg tablet, Take 25 mg by mouth 2 (two) times a day, Disp: , Rfl:     pravastatin (PRAVACHOL) 20 mg tablet, Take 20 mg by mouth, Disp: , Rfl:           Whom besides the patient is providing clinical information about today's encounter?   NO ADDITIONAL HISTORIAN (patient alone provided history)    Physical Exam and Assessment/Plan by Diagnosis:      HISTORY OF BASAL CELL CARCINOMA    Physical Exam:  Anatomic Location Affected:  Right Lower Eyelid  Morphological Description of scar:  Well healed surgical scar  Suspected Recurrence: No  Pertinent Positives:  Pertinent Negatives:      Additional History of Present Condition:  History of basal cell carcinoma with no sign of recurrence. Approx 7 years ago    Assessment and Plan:  Based on a thorough discussion of this condition and the management approach to it (including a comprehensive discussion of the known risks, side effects and potential benefits of treatment), the patient (family) agrees to implement the following specific plan:  Monitor for recurrence   When outside we recommend using a wide brim hat, sunglasses, long sleeve and pants, sunscreen with SPF 30+ with reapplication every 2 hours, or SPF specific clothing       How can basal cell carcinoma be prevented?  The most important way to prevent BCC is to avoid sunburn. This is especially important in childhood and early life. Fair skinned individuals and those with a personal or family history of BCC should protect their skin from sun exposure daily, year-round and lifelong.  Stay indoors or under the shade in the middle of the day   Wear covering clothing   Apply high protection factor SPF50+ broad-spectrum sunscreens generously to exposed skin if outdoors   Avoid indoor tanning (sun beds, solaria)  Oral nicotinamide (vitamin B3) in a dose of 500 mg twice daily may reduce the number and severity of BCCs.    What is the outlook for basal cell carcinoma?  Most BCCs are cured by  treatment. Cure is most likely if treatment is undertaken when the lesion is small.  About 50% of people with BCC develop a second one within 3 years of the first. They are also at increased risk of other skin cancers, especially melanoma. Regular self-skin examinations and long-term annual skin checks by an experienced health professional are recommended.      ACTINIC CHEILITIS     Physical Exam:  Anatomic Location Affected:  lower lip  Morphological Description:  fine scale   Pertinent Positives:  Pertinent Negatives:    Additional History of Present Condition:  Treated in 2021 with topical Fluorouracil cream twice a day for 2 weeks.     Assessment and Plan:  Based on a thorough discussion of this condition and the management approach to it (including a comprehensive discussion of the known risks, side effects and potential benefits of treatment), the patient (family) agrees to implement the following specific plan:  Start topical Fluorouracil cream twice a day for 2 weeks to the lower lip and pigmented right upper lip area.  To chest apply twice a day for 3 weeks.  Apply vaseline to lips prior to using efudex to avoid ingestion of topical medication  If scaling of lower lip does not resolve or pigmented actinic keratosis above right upper lip does not resolve within 4-6 weeks of efudex therapy please contact office to be re-evaluated and would consider biopsy.   Redness and irritation are to be expected during the course of treatment and while lesions are healing. Patient aware of expectations.  -Patient plans to start cream in September and understands risk vs benefit of treating now vs in September    ACTINIC KERATOSES  - Relevant exam: On the central chest is a thin scaly papule and on the area above right upper lip is a brown pigmented macule with gray-brown are gritty pink papules  - Exam and clinical history consistent with actinic keratoses  - Discussed that these lesions are considered premalignant with  "the potential to evolve into squamous cell carcinoma.   - Discussed that these are due to chronic sun exposure and therefore recommend use of sunscreen/sun protection to prevent further sun damage  - Discussed treatment options, including liquid nitrogen destruction, topical immunotherapy, and photodynamic therapy, including risks, benefits      - The patient agreed to treatment with topical efudex 5% for 14 days BID to the lower lip and pigmented area above right side of lip; 14 days BID to the lesion on central chest.  - Common side effects for this treatment were discussed   If scaling of lower lip does not resolve or pigmented actinic keratosis above right upper lip does not resolve within 4-6 weeks of efudex therapy please contact office to be re-evaluated and would consider biopsy.   -Patient plans to start cream in September and understands risk vs benefit of treating now vs in September      RESENDIZ ANGIOMAS     Physical Exam:  Anatomic Location Affected:  Trunk and extremities  Morphological Description:  Scattered cherry red papules  Denies pain, itch, bleeding. No treatments tried. Present for years. Present constantly; no modifying factors which make it worse or better.     Assessment and Plan:  Based on a thorough discussion of this condition and the management approach to it (including a comprehensive discussion of the known risks, side effects and potential benefits of treatment), the patient (family) agrees to implement the following specific plan:  Reassure benign        SEBORRHEIC KERATOSIS; NON-INFLAMED     Physical Exam:  Anatomic Location Affected:  Trunk and extremities  Morphological Description:  Waxy, smooth to warty textured, yellow to brownish-grey to dark brown to blackish, discrete, \"stuck-on\" appearing papules.  Present for years. Denies pain, itch, bleeding.      Additional History of Present Condition:  Present constantly; no modifying factors which make it worse or better. No prior " "treatment.       Assessment and Plan:  Based on a thorough discussion of this condition and the management approach to it (including a comprehensive discussion of the known risks, side effects and potential benefits of treatment), the patient (family) agrees to implement the following specific plan:  Reassure benign  Use sun protection.  Apply SPF 30 or higher at least three times a day.  Wear sun protecting clothing and hats.  Discussed $150 to treat with LN2 up to 10 lesions if not bothersome versus $250 by cosmetic dermatologist to numb, cauterize and curette lesions   Discussed over the counter creams containaining lactic acids.    Amlactin's line of lotions (lactic acid containing)                                 SOLAR LENTIGINES   OTHER SKIN CHANGES DUE TO CHRONIC EXPOSURE TO NONIONIZING RADIATION     Physical Exam:  Anatomic Location Affected:  Sun exposed areas of back, chest, arms, legs  Morphological Description:  Multiple scattered brown to tan evenly pigmented macules   Denies pain, itch, bleeding. No treatments tried. Present for months - years. Reports getting newer lesions with sun exposure.         Assessment and Plan:  Based on a thorough discussion of this condition and the management approach to it (including a comprehensive discussion of the known risks, side effects and potential benefits of treatment), the patient (family) agrees to implement the following specific plan:  Reassure benign  Use sun protection.  Apply SPF 30 or higher at least three times a day.  Wear sun protecting clothing and hats.         MULTIPLE MELANOCYTIC NEVI (\"Moles\")     Physical Exam:  Anatomic Location Affected: Trunk and extremities  Morphological Description:  Scattered, round to ovoid, symmetrical-appearing, even bordered, skin colored to dark brown macules/papules  Denies pain, itch, bleeding. No treatments tried. Present for years. Present constantly; no modifying factors which make it worse or better. Denies " actively changing or growing moles.      Assessment and Plan:  Based on a thorough discussion of this condition and the management approach to it (including a comprehensive discussion of the known risks, side effects and potential benefits of treatment), the patient (family) agrees to implement the following specific plan:  Reassure benign  Monitor for changes  Use sun protection.  Apply SPF 30 or higher at least three times a day.  Wear sun protecting clothing and hats.    Scribe Attestation      I,:  Bety Redd am acting as a scribe while in the presence of the attending physician.:       I,:  Jennifer Soto PA-C personally performed the services described in this documentation    as scribed in my presence.:

## 2024-07-03 NOTE — PROGRESS NOTES
"[facility] Note     Patient Name: Akua Winn  Encounter Date: 07/03/24     Have you been cared for by a [facility] Dermatologist in the last 3 years and, if so, which description applies to you?    Yes.  I have been here within the last 3 years, and my medical history has NOT changed since that time.  I am FEMALE/of child-bearing potential.    REVIEW OF SYSTEMS:  Have you recently had or currently have any of the following? No changes in my recent health.   PAST MEDICAL HISTORY:  Have you personally ever had or currently have any of the following?  If \"YES,\" then please provide more detail. No changes in my medical history.   HISTORY OF IMMUNOSUPPRESSION: Do you have a history of any of the following:  Systemic Immunosuppression such as Diabetes, Biologic or Immunotherapy, Chemotherapy, Organ Transplantation, Bone Marrow Transplantation?  No     Answering \"YES\" requires the addition of the dotphrase \"IMMUNOSUPPRESSED\" as the first diagnosis of the patient's visit.   FAMILY HISTORY:  Any \"first degree relatives\" (parent, brother, sister, or child) with the following?    No changes in my family's known health.   PATIENT EXPERIENCE:    Do you want the Dermatologist to perform a COMPLETE skin exam today including a clinical examination under the \"bra and underwear\" areas?  Yes  If necessary, do we have your permission to call and leave a detailed message on your Preferred Phone number that includes your specific medical information?  Yes      No Known Allergies   Current Outpatient Medications:     amiodarone 200 mg tablet, Take 1 tablet by mouth daily, Disp: , Rfl:     amoxicillin (AMOXIL) 500 mg capsule, TAKE 4 CAPSULES (2000MG) ONCE, ONE HOUR PRIOR TO DENTAL PROCEDURES, Disp: , Rfl:     apixaban (Eliquis) 5 mg, TAKE 1 TABLET (5 MG TOTAL) BY MOUTH 2 TIMES A DAY TWICE DAILY, Disp: , Rfl:     metoprolol succinate (TOPROL-XL) 25 mg 24 hr tablet, Take 12.5 mg by mouth, Disp: , Rfl:     metoprolol " tartrate (LOPRESSOR) 25 mg tablet, Take 25 mg by mouth 2 (two) times a day, Disp: , Rfl:     pravastatin (PRAVACHOL) 20 mg tablet, Take 20 mg by mouth, Disp: , Rfl:           Whom besides the patient is providing clinical information about today's encounter?   NO ADDITIONAL HISTORIAN (patient alone provided history)    Physical Exam and Assessment/Plan by Diagnosis:

## 2024-09-14 ENCOUNTER — APPOINTMENT (OUTPATIENT)
Dept: RADIOLOGY | Facility: MEDICAL CENTER | Age: 69
End: 2024-09-14
Attending: PHYSICIAN ASSISTANT
Payer: COMMERCIAL

## 2024-09-14 ENCOUNTER — OFFICE VISIT (OUTPATIENT)
Dept: URGENT CARE | Facility: MEDICAL CENTER | Age: 69
End: 2024-09-14
Payer: COMMERCIAL

## 2024-09-14 VITALS
OXYGEN SATURATION: 99 % | SYSTOLIC BLOOD PRESSURE: 156 MMHG | DIASTOLIC BLOOD PRESSURE: 71 MMHG | RESPIRATION RATE: 18 BRPM | HEART RATE: 63 BPM | TEMPERATURE: 98 F
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
## 2024-09-14 DIAGNOSIS — S40.811A ABRASION OF RIGHT UPPER EXTREMITY, INITIAL ENCOUNTER: ICD-10-CM

## 2024-09-14 DIAGNOSIS — M79.621 PAIN IN RIGHT UPPER ARM: Primary | ICD-10-CM

## 2024-09-14 PROCEDURE — 99214 OFFICE O/P EST MOD 30 MIN: CPT | Performed by: PHYSICIAN ASSISTANT

## 2024-09-14 PROCEDURE — 73090 X-RAY EXAM OF FOREARM: CPT

## 2024-09-14 RX ORDER — MAVACAMTEN 5 MG/1
5 CAPSULE, GELATIN COATED ORAL DAILY
COMMUNITY
Start: 2024-08-23 | End: 2024-11-21

## 2024-09-15 NOTE — PROGRESS NOTES
[facility]        NAME: Akua Winn is a 68 y.o. female  : 1955    MRN: [id number]  DATE: 2024  TIME: 8:24 PM    Assessment and Plan   Pain in right upper arm [M79.621]  1. Pain in right upper arm  Ambulatory Referral to Orthopedic Surgery      2. Fall, initial encounter  XR forearm 2 vw right      3. Abrasion of right upper extremity, initial encounter              Patient Instructions       Follow up with therapeutics.  X-ray read by me as a suspected radial head fracture on the right with a positive fat pad sign.  Ice, Tylenol and sling applied by nursing.  If tests have been performed at [facility], our office will contact you with results if changes need to be made to the care plan discussed with you at the visit.  [facility]    Chief Complaint     Chief Complaint   Patient presents with    Fall     Patient c/o right arm pain after she fell getting down from a ladder.          History of Present Illness       Patient had a FOOSH injury to the right dominant arm about 5 hours ago.  She complains of right elbow pain with difficulty moving and pronation and supination.  She also complains of an abrasion along the posterior portion of her arm.  She is on Eliquis.        Review of Systems   Review of Systems   All other systems reviewed and are negative.        Current Medications       Current Outpatient Medications:     Mavacamten (Camzyos) 5 MG CAPS, Take 5 mg by mouth daily, Disp: , Rfl:     amiodarone 200 mg tablet, Take 1 tablet by mouth daily, Disp: , Rfl:     amoxicillin (AMOXIL) 500 mg capsule, TAKE 4 CAPSULES (2000MG) ONCE, ONE HOUR PRIOR TO DENTAL PROCEDURES, Disp: , Rfl:     apixaban (Eliquis) 5 mg, TAKE 1 TABLET (5 MG TOTAL) BY MOUTH 2 TIMES A DAY TWICE DAILY, Disp: , Rfl:     fluorouracil (EFUDEX) 5 % cream, Apply topically 2 (two) times a day to the lower lip and right upper lip area for 2 weeks. To chest apply twice a day  for up to 3 weeks., Disp: 40 g, Rfl: 0    metoprolol succinate (TOPROL-XL) 25 mg 24 hr tablet, Take 12.5 mg by mouth, Disp: , Rfl:     metoprolol tartrate (LOPRESSOR) 25 mg tablet, Take 25 mg by mouth 2 (two) times a day, Disp: , Rfl:     pravastatin (PRAVACHOL) 20 mg tablet, Take 20 mg by mouth, Disp: , Rfl:     Current Allergies     Allergies as of 09/14/2024    (No Known Allergies)            The following portions of the patient's history were reviewed and updated as appropriate: allergies, current medications, past family history, past medical history, past social history, past surgical history and problem list.     Past Medical History:   Diagnosis Date    Basal cell carcinoma     Right lowere rayna       Past Surgical History:   Procedure Laterality Date    MOHS SURGERY      Right lower eyelid    SKIN BIOPSY         Family History   Problem Relation Age of Onset    Basal cell carcinoma Mother     Basal cell carcinoma Father          Medications have been verified.        Objective   /71   Pulse 63   Temp 98 °F (36.7 °C)   Resp 18   SpO2 99%   No LMP recorded.       Physical Exam     Physical Exam  Vitals and nursing note reviewed.   Constitutional:       Appearance: Normal appearance. She is normal weight.   Cardiovascular:      Rate and Rhythm: Normal rate and regular rhythm.      Pulses: Normal pulses.   Neurological:      Mental Status: She is alert.     Right elbow tender over the radial head with pain with supination and pronation.  Wrist full range of motion no tenderness in shoulder full range of motion without tenderness.  There is ecchymosis over the radial head with an abrasion along the posterior aspect of the lower humerus and into the elbow.

## 2024-09-16 ENCOUNTER — OFFICE VISIT (OUTPATIENT)
Dept: OBGYN CLINIC | Facility: HOSPITAL | Age: 69
End: 2024-09-16
Payer: COMMERCIAL

## 2024-09-16 VITALS — BODY MASS INDEX: 17.88 KG/M2 | WEIGHT: 118 LBS | HEIGHT: 68 IN

## 2024-09-16 DIAGNOSIS — S52.134A CLOSED NONDISPLACED FRACTURE OF NECK OF RIGHT RADIUS, INITIAL ENCOUNTER: Primary | ICD-10-CM

## 2024-09-16 PROCEDURE — 99203 OFFICE O/P NEW LOW 30 MIN: CPT | Performed by: SURGERY

## 2024-09-16 NOTE — LETTER
September 16, 2024     Patient: Akua Winn  YOB: 1955  Date of Visit: 9/16/2024      To Whom it May Concern:    Akua Winn is under my professional care. Ginger was seen in my office on 9/16/2024. Ginger will remain out of work for the remainder of this week. After that she will be able to return to work with the following restriction: no lifting with the right upper extremity over 5 lbs. Restrictions will apply for an additional 5 weeks. We will continue to follow up with her regularly and adjust restrictions as needed.     If you have any questions or concerns, please don't hesitate to call.         Sincerely,          Russ Del Toro MD        CC: No Recipients

## 2024-09-16 NOTE — PROGRESS NOTES
Russ Del Toro M.D.  Attending, Orthopaedic Surgery  Hand, Wrist, and Elbow Surgery  [facility]      ORTHOPAEDIC HAND, WRIST, AND ELBOW OFFICE  VISIT       ASSESSMENT/PLAN:      69 yo female with right radial head/neck fracture, DOI 9/14/24    X-rays were reviewed in office today. She has a nondisplaced radial head/neck fracture which we discussed should heal without complication or surgical intervention. She may remove the sling as tolerated and work on ROM of the elbow in all planes. 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 We will plan to see her back in one week for repeat xrays of the right elbow. Recommend local wound care for the elbow abrasions.     The patient verbalized understanding of exam findings and treatment plan. We engaged in the shared decision-making process and treatment options were discussed at length with the patient. Surgical and conservative management discussed today along with risks and benefits.    Diagnoses and all orders for this visit:    Closed nondisplaced fracture of neck of right radius, initial encounter  -     Ambulatory Referral to Orthopedic Surgery        Follow Up:  Return in about 1 week (around 9/23/2024) for Recheck.    To Do Next Visit:  Re-evaluation of current issue and X-rays of the  right  elbow      General Discussions:  Fracture - Nonoperative Care: The physiology of a fractured bone was discussed with the patient today.  With non-displaced or minimally displaced fractures, conservative treatment such as casting or splinting often results in a functional recovery.  Typically, these fractures are immobilized in either a cast or splint depending on the pattern.  Radiographs are typically taken at intervals throughout the fracture healing to ensure that reduction or alignment is not lost.  If the fracture loses its alignment, surgical intervention may  be required to stabilize it.  Medical conditions such as diabetes, osteoporosis, vitamin D deficiency, and a history of or exposure to smoking may delay or prevent fracture healing. Options between cast/splint immobilization and surgical treatment were offered and the risks and benefits of both were discussed.     ____________________________________________________________________________________________________________________________________________      CHIEF COMPLAINT:  Chief Complaint   Patient presents with    Right Wrist - Fracture     Patient had a fall on Saturday they placed her in a sling no splint was placed        SUBJECTIVE:  Akua Winn is a 68 y.o. year old RHD female seen in consultation requested by Danny FLORES who presents for evaluation of the right elbow. She reports falling while painting and landed on the right hand and elbow. She was seen at urgent care and provided with a sling. Pain has improved since then but is still present. She denies paresthesias or other injuries. Patient works as a wound care nurse.        Pain/symptom timing:  Worse during the day when active  Pain/symptom context:  Worse with activites and work  Pain/symptom modifying factors:  Rest makes better, activities make worse  Pain/symptom associated signs/symptoms: none    Prior treatment   NSAIDsYes   Injections Not Asked   Bracing/Orthotics Not Asked    Physical Therapy Not Asked     I have personally reviewed all the relevant PMH, PSH, SH, FH, Medications and allergies      PAST MEDICAL HISTORY:  Past Medical History:   Diagnosis Date    Basal cell carcinoma     Right lowere yelid       PAST SURGICAL HISTORY:  Past Surgical History:   Procedure Laterality Date    MOHS SURGERY      Right lower eyelid    SKIN BIOPSY         FAMILY HISTORY:  Family History   Problem Relation Age of Onset    Basal cell carcinoma Mother     Basal cell carcinoma Father        SOCIAL HISTORY:  Social History     Tobacco Use     Smoking status: Never    Smokeless tobacco: Never   Vaping Use    Vaping status: Never Used   Substance Use Topics    Alcohol use: Yes     Comment: occasionally    Drug use: Never       MEDICATIONS:    Current Outpatient Medications:     amiodarone 200 mg tablet, Take 1 tablet by mouth daily, Disp: , Rfl:     amoxicillin (AMOXIL) 500 mg capsule, TAKE 4 CAPSULES (2000MG) ONCE, ONE HOUR PRIOR TO DENTAL PROCEDURES, Disp: , Rfl:     fluorouracil (EFUDEX) 5 % cream, Apply topically 2 (two) times a day to the lower lip and right upper lip area for 2 weeks. To chest apply twice a day for up to 3 weeks., Disp: 40 g, Rfl: 0    Mavacamten (Camzyos) 5 MG CAPS, Take 5 mg by mouth daily, Disp: , Rfl:     metoprolol succinate (TOPROL-XL) 25 mg 24 hr tablet, Take 12.5 mg by mouth, Disp: , Rfl:     metoprolol tartrate (LOPRESSOR) 25 mg tablet, Take 25 mg by mouth 2 (two) times a day, Disp: , Rfl:     pravastatin (PRAVACHOL) 20 mg tablet, Take 20 mg by mouth, Disp: , Rfl:     apixaban (Eliquis) 5 mg, TAKE 1 TABLET (5 MG TOTAL) BY MOUTH 2 TIMES A DAY TWICE DAILY, Disp: , Rfl:     ALLERGIES:  No Known Allergies        REVIEW OF SYSTEMS:  Review of Systems   Constitutional:  Negative for chills, fatigue and fever.   HENT:  Negative for hearing loss, nosebleeds and sore throat.    Eyes:  Negative for redness and visual disturbance.   Respiratory:  Negative for cough, shortness of breath and wheezing.    Cardiovascular:  Negative for chest pain, palpitations and leg swelling.   Gastrointestinal:  Negative for abdominal pain, nausea and vomiting.   Endocrine: Negative for polydipsia and polyuria.   Genitourinary:  Negative for difficulty urinating, dysuria and hematuria.   Musculoskeletal:  Positive for arthralgias. Negative for joint swelling and myalgias.   Skin:  Negative for rash and wound.   Neurological:  Negative for speech difficulty, weakness, numbness and headaches.   Psychiatric/Behavioral:  Negative for decreased concentration  and suicidal ideas. The patient is not nervous/anxious.        VITALS:  There were no vitals filed for this visit.    LABS:      _____________________________________________________  PHYSICAL EXAMINATION:  General: well developed and well nourished, alert, oriented times 3, and appears comfortable  Psychiatric: Normal  HEENT: Normocephalic, Atraumatic Trachea Midline, No torticollis  Pulmonary: No audible wheezing or respiratory distress   Abdomen/GI: Non tender, non distended   Cardiovascular: No pitting edema, 2+ radial pulse   Skin: see below  Neurovascular: Sensation Intact to the Median, Ulnar, Radial Nerve, Motor Intact to the Median, Ulnar, Radial Nerve, and Pulses Intact  Musculoskeletal: Normal, except as noted in detailed exam and in HPI.      MUSCULOSKELETAL EXAMINATION:  Right Elbow:   Abrasions over the elbow, forearm, and hand  No signs of infection   Tenderness at radial head  Near full extension and flexion   Near full supination and pronation, more painful with elbow extended  Sensation intact     ___________________________________________________  STUDIES REVIEWED:  I have personally reviewed and interpreted  AP lateral and oblique radiographs of the right elbow which demonstrate nondisplaced radial head/neck fracture       LABS REVIEWED:    HgA1c:   Lab Results   Component Value Date    HGBA1C 5.2 08/12/2017     BMP:   Lab Results   Component Value Date    CALCIUM 9.5 02/22/2024    K 4.2 02/22/2024    CO2 27 02/22/2024     02/22/2024    BUN 16 02/22/2024    CREATININE 0.78 02/22/2024               PROCEDURES PERFORMED:  Procedures  No Procedures performed today    _____________________________________________________      Scribe Attestation      I,:  Alka Navarro PA-C am acting as a scribe while in the presence of the attending physician.:       I,:  Russ Del Toro MD personally performed the services described in this documentation    as scribed in my presence.:

## 2024-09-23 ENCOUNTER — HOSPITAL ENCOUNTER (OUTPATIENT)
Dept: RADIOLOGY | Facility: HOSPITAL | Age: 69
Discharge: HOME | End: 2024-09-23
Attending: SURGERY
Payer: COMMERCIAL

## 2024-09-23 ENCOUNTER — OFFICE VISIT (OUTPATIENT)
Dept: OBGYN CLINIC | Facility: HOSPITAL | Age: 69
End: 2024-09-23
Payer: COMMERCIAL

## 2024-09-23 VITALS — WEIGHT: 118 LBS | BODY MASS INDEX: 17.88 KG/M2 | HEIGHT: 68 IN

## 2024-09-23 DIAGNOSIS — M25.531 PAIN IN RIGHT WRIST: ICD-10-CM

## 2024-09-23 DIAGNOSIS — S52.134A CLOSED NONDISPLACED FRACTURE OF NECK OF RIGHT RADIUS, INITIAL ENCOUNTER: ICD-10-CM

## 2024-09-23 DIAGNOSIS — M25.531 PAIN IN RIGHT WRIST: Primary | ICD-10-CM

## 2024-09-23 PROCEDURE — 73110 X-RAY EXAM OF WRIST: CPT

## 2024-09-23 PROCEDURE — 99213 OFFICE O/P EST LOW 20 MIN: CPT | Performed by: SURGERY

## 2024-09-23 PROCEDURE — 73080 X-RAY EXAM OF ELBOW: CPT

## 2024-09-23 NOTE — PROGRESS NOTES
ASSESSMENT/PLAN:        68 y.o. who presents for follow up for right radial head/neck fracture and DeQuervain.     It was discussed that at this time she will continue to be limited to lifting 5-10 lbs. It was discussed that her wrist pain would be due to using her arm differently since her injury. It was discussed that at this time she can not return to work without restriction. 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 She will follow up in clinic in 2-3 weeks for repeat x-rays of her elbow.     The anatomy and physiology of de Quervain's tenosynovitis was discussed with the patient today in the office.  Edema and increased contact pressure within the first dorsal extensor compartment at the radial styloid can cause pain, crepitation, and limitation of function.  Treatment options include resting thumb spica splints to decrease edema, oral anti-inflammatory medications, home or formal therapy exercises, up to 2 steroid injections within the first dorsal extensor compartment, or surgical release.  While majority of patients do respond to conservative treatment, up to 20% may require surgical release.       The patient verbalized understanding of exam findings and treatment plan. We engaged in the shared decision-making process and treatment options were discussed at length with the patient. Surgical and conservative management discussed today along with risks and benefits.    Diagnoses and all orders for this visit:    Pain in right wrist  -     XR wrist 3+ vw right; Future    Closed nondisplaced fracture of neck of right radius, initial encounter  -     XR elbow 3+ vw right; Future          Follow Up:  No follow-ups on file.      To Do Next Visit:  Re-evaluation of current issue and X-rays of the  right   elbow    ____________________________________________________________________________________________________________________________________________      CHIEF COMPLAINT:  Chief Complaint   Patient presents with    Follow-up     Patient has had a little more pain in the last couple days she has had more pain in her wrist rather then the elbow       SUBJECTIVE:  Akua Winn is a 68 y.o. year old RHD female who presents for follow up of right radial head/neck fracture. She states that she is continuing to have stiffness in her elbow as well as pain in her wrist. She has been able to do activities of daily living with minimal pain.        I have personally reviewed all the relevant PMH, PSH, SH, FH, Medications and allergies.     PAST MEDICAL HISTORY:  Past Medical History:   Diagnosis Date    Basal cell carcinoma     Right lowere yelid       PAST SURGICAL HISTORY:  Past Surgical History:   Procedure Laterality Date    MOHS SURGERY      Right lower eyelid    SKIN BIOPSY         FAMILY HISTORY:  Family History   Problem Relation Age of Onset    Basal cell carcinoma Mother     Basal cell carcinoma Father        SOCIAL HISTORY:  Social History     Tobacco Use    Smoking status: Never    Smokeless tobacco: Never   Vaping Use    Vaping status: Never Used   Substance Use Topics    Alcohol use: Yes     Comment: occasionally    Drug use: Never       MEDICATIONS:    Current Outpatient Medications:     amiodarone 200 mg tablet, Take 1 tablet by mouth daily, Disp: , Rfl:     amoxicillin (AMOXIL) 500 mg capsule, TAKE 4 CAPSULES (2000MG) ONCE, ONE HOUR PRIOR TO DENTAL PROCEDURES, Disp: , Rfl:     fluorouracil (EFUDEX) 5 % cream, Apply topically 2 (two) times a day to the lower lip and right upper lip area for 2 weeks. To chest apply twice a day for up to 3 weeks., Disp: 40 g, Rfl: 0    Mavacamten (Camzyos) 5 MG CAPS, Take 5 mg by mouth daily, Disp: , Rfl:     metoprolol succinate (TOPROL-XL) 25 mg 24 hr tablet, Take 12.5 mg by  mouth, Disp: , Rfl:     metoprolol tartrate (LOPRESSOR) 25 mg tablet, Take 25 mg by mouth 2 (two) times a day, Disp: , Rfl:     pravastatin (PRAVACHOL) 20 mg tablet, Take 20 mg by mouth, Disp: , Rfl:     apixaban (Eliquis) 5 mg, TAKE 1 TABLET (5 MG TOTAL) BY MOUTH 2 TIMES A DAY TWICE DAILY, Disp: , Rfl:     ALLERGIES:  No Known Allergies    REVIEW OF SYSTEMS:  Review of Systems    VITALS:  There were no vitals filed for this visit.      _____________________________________________________  PHYSICAL EXAMINATION:  General: well developed and well nourished, alert, oriented times 3, and appears comfortable  Psychiatric: Normal  HEENT: Normocephalic, Atraumatic Trachea Midline, No torticollis  Pulmonary: No audible wheezing or respiratory distress   Cardiovascular: No pitting edema, 2+ radial pulse   Abdominal/GI: abdomen non tender, non distended   Skin: No masses, erythema, lacerations, fluctation, ulcerations  Neurovascular: Sensation Intact to the Median, Ulnar, Radial Nerve, Motor Intact to the Median, Ulnar, Radial Nerve, and Pulses Intact  Musculoskeletal: Normal, except as noted in detailed exam and in HPI.      MUSCULOSKELETAL EXAMINATION:    Right Elbow:   No signs of infection   Tenderness at radial head  Near full extension and flexion   Near full supination and pronation, more painful with elbow extended  Sensation intact   ___________________________________________________    STUDIES REVIEWED:  I have personally reviewed and interpreted  AP lateral and oblique radiographs of right elbow and right wrist which demonstrate nondisplaced radial head/neck fracture.          LABS REVIEWED:    HgA1c:   Lab Results   Component Value Date    HGBA1C 5.2 08/12/2017     BMP:   Lab Results   Component Value Date    CALCIUM 9.5 02/22/2024    K 4.2 02/22/2024    CO2 27 02/22/2024     02/22/2024    BUN 16 02/22/2024    CREATININE 0.78 02/22/2024             PROCEDURES PERFORMED:  Procedures        _____________________________________________________      Scribe Attestation      I,:   am acting as a scribe while in the presence of the attending physician.:       I,:   personally performed the services described in this documentation    as scribed in my presence.:

## 2024-09-23 NOTE — LETTER
September 23, 2024     Patient: Akua Winn  YOB: 1955  Date of Visit: 9/23/2024      To Whom it May Concern:    Akua Winn is under my professional care. Akua was seen in my office on 9/23/2024. Akua has a right upper extremity injury that is being treated. 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 She may not use her right upper extremity for any lifting.       If you have any questions or concerns, please don't hesitate to call.         Sincerely,          Russ Del Toro MD        CC: No Recipients

## 2024-10-09 ENCOUNTER — TELEPHONE (OUTPATIENT)
Dept: OBGYN CLINIC | Facility: HOSPITAL | Age: 69
End: 2024-10-09

## 2024-10-14 ENCOUNTER — HOSPITAL ENCOUNTER (OUTPATIENT)
Dept: RADIOLOGY | Facility: HOSPITAL | Age: 69
Discharge: HOME | End: 2024-10-14
Attending: SURGERY
Payer: COMMERCIAL

## 2024-10-14 ENCOUNTER — OFFICE VISIT (OUTPATIENT)
Dept: OBGYN CLINIC | Facility: HOSPITAL | Age: 69
End: 2024-10-14
Payer: COMMERCIAL

## 2024-10-14 VITALS — BODY MASS INDEX: 17.88 KG/M2 | HEIGHT: 68 IN | WEIGHT: 118 LBS

## 2024-10-14 DIAGNOSIS — S52.134A CLOSED NONDISPLACED FRACTURE OF NECK OF RIGHT RADIUS, INITIAL ENCOUNTER: Primary | ICD-10-CM

## 2024-10-14 DIAGNOSIS — S52.134A CLOSED NONDISPLACED FRACTURE OF NECK OF RIGHT RADIUS, INITIAL ENCOUNTER: ICD-10-CM

## 2024-10-14 PROCEDURE — 73080 X-RAY EXAM OF ELBOW: CPT

## 2024-10-14 PROCEDURE — 99213 OFFICE O/P EST LOW 20 MIN: CPT | Performed by: SURGERY

## 2024-10-14 NOTE — PROGRESS NOTES
ASSESSMENT/PLAN:      69-year-old female here for right nondisplaced radial neck fracture, 9/14/2024.  New x-rays of the right elbow were obtained today and reviewed with the patient noting that there is early callus formation.  On exam patient does mild discomfort with pronation supination and lacks approximately 5 to 10 degrees of extension.  Encouraged the patient to continue with her gentle range of motion.  She will continue on the work restrictions of computer work and charting until she is full duty on 11/4/2024.  We will see her in 4 weeks for reevaluation.    The patient verbalized understanding of exam findings and treatment plan. We engaged in the shared decision-making process and treatment options were discussed at length with the patient. Surgical and conservative management discussed today along with risks and benefits.    Diagnoses and all orders for this visit:    Closed nondisplaced fracture of neck of right radius, initial encounter  -     XR elbow 3+ vw right; Future          Follow Up:  Return in about 4 weeks (around 11/11/2024) for right elbow.      To Do Next Visit:  Re-evaluation of current issue    ____________________________________________________________________________________________________________________________________________      CHIEF COMPLAINT:  Chief Complaint   Patient presents with    Follow-up     Follow up of the right arm. Everything going well. Having a little pain.       SUBJECTIVE:  Akua Winn is a 69 y.o. year old RHD female who presents today for her right radial head/neck fracture and DeQuervain, 9/14/2024. 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   Otherwise patient has no significant pain where she needs to take medication for.      I have personally reviewed all the relevant PMH, PSH, SH, FH, Medications and allergies.     PAST MEDICAL HISTORY:  Past Medical History:   Diagnosis Date    Basal cell carcinoma     Right  damaso jennciarra       PAST SURGICAL HISTORY:  Past Surgical History:   Procedure Laterality Date    MOHS SURGERY      Right lower eyelid    SKIN BIOPSY         FAMILY HISTORY:  Family History   Problem Relation Age of Onset    Basal cell carcinoma Mother     Basal cell carcinoma Father        SOCIAL HISTORY:  Social History     Tobacco Use    Smoking status: Never    Smokeless tobacco: Never   Vaping Use    Vaping status: Never Used   Substance Use Topics    Alcohol use: Yes     Comment: occasionally    Drug use: Never       MEDICATIONS:    Current Outpatient Medications:     amiodarone 200 mg tablet, Take 1 tablet by mouth daily, Disp: , Rfl:     amoxicillin (AMOXIL) 500 mg capsule, TAKE 4 CAPSULES (2000MG) ONCE, ONE HOUR PRIOR TO DENTAL PROCEDURES, Disp: , Rfl:     fluorouracil (EFUDEX) 5 % cream, Apply topically 2 (two) times a day to the lower lip and right upper lip area for 2 weeks. To chest apply twice a day for up to 3 weeks., Disp: 40 g, Rfl: 0    Mavacamten (Camzyos) 5 MG CAPS, Take 5 mg by mouth daily, Disp: , Rfl:     metoprolol succinate (TOPROL-XL) 25 mg 24 hr tablet, Take 12.5 mg by mouth, Disp: , Rfl:     metoprolol tartrate (LOPRESSOR) 25 mg tablet, Take 25 mg by mouth 2 (two) times a day, Disp: , Rfl:     pravastatin (PRAVACHOL) 20 mg tablet, Take 20 mg by mouth, Disp: , Rfl:     apixaban (Eliquis) 5 mg, TAKE 1 TABLET (5 MG TOTAL) BY MOUTH 2 TIMES A DAY TWICE DAILY, Disp: , Rfl:     ALLERGIES:  No Known Allergies    REVIEW OF SYSTEMS:  Review of Systems    VITALS:  Vitals:         _____________________________________________________  PHYSICAL EXAMINATION:  General: well developed and well nourished, alert, oriented times 3, and appears comfortable  Psychiatric: Normal  HEENT: Normocephalic, Atraumatic Trachea Midline, No torticollis  Pulmonary: No audible wheezing or respiratory distress   Cardiovascular: No pitting edema, 2+ radial pulse   Abdominal/GI: abdomen non tender, non distended   Skin: No  masses, erythema, lacerations, fluctation, ulcerations  Neurovascular: Sensation Intact to the Median, Ulnar, Radial Nerve, Motor Intact to the Median, Ulnar, Radial Nerve, and Pulses Intact  Musculoskeletal: Normal, except as noted in detailed exam and in HPI.      MUSCULOSKELETAL EXAMINATION:  Right elbow:     Lacking 5-10 degrees of extension  Full Flexion  Full pronation and supination at the end range  Mild tenderness at the radius  Full composite fist  Opposition intact  Sensation intact to light touch distally  Brisk capillary refill     ___________________________________________________    STUDIES REVIEWED:  I have personally reviewed and interpreted  AP lateral and oblique radiographs of xrays of right elbow 3 views   which demonstrate stable alignment of radial neck fracture, with early signs of healing        LABS REVIEWED:    HgA1c:   Lab Results   Component Value Date    HGBA1C 5.2 08/12/2017     BMP:   Lab Results   Component Value Date    CALCIUM 9.5 02/22/2024    K 4.2 02/22/2024    CO2 27 02/22/2024     02/22/2024    BUN 16 02/22/2024    CREATININE 0.78 02/22/2024             PROCEDURES PERFORMED:  Procedures  No Procedures performed today    _____________________________________________________      Scribe Attestation      I,:  Dulce Maria Reed am acting as a scribe while in the presence of the attending physician.:       I,:  Russ Del Toro MD personally performed the services described in this documentation    as scribed in my presence.:

## 2024-11-11 ENCOUNTER — OFFICE VISIT (OUTPATIENT)
Dept: OBGYN CLINIC | Facility: HOSPITAL | Age: 69
End: 2024-11-11
Payer: COMMERCIAL

## 2024-11-11 ENCOUNTER — HOSPITAL ENCOUNTER (OUTPATIENT)
Dept: RADIOLOGY | Facility: HOSPITAL | Age: 69
Discharge: HOME | End: 2024-11-11
Attending: SURGERY
Payer: COMMERCIAL

## 2024-11-11 VITALS — WEIGHT: 118 LBS | HEIGHT: 68 IN | BODY MASS INDEX: 17.88 KG/M2

## 2024-11-11 DIAGNOSIS — S52.134D CLOSED NONDISPLACED FRACTURE OF NECK OF RIGHT RADIUS WITH ROUTINE HEALING, SUBSEQUENT ENCOUNTER: Primary | ICD-10-CM

## 2024-11-11 DIAGNOSIS — S52.134A CLOSED NONDISPLACED FRACTURE OF NECK OF RIGHT RADIUS, INITIAL ENCOUNTER: ICD-10-CM

## 2024-11-11 PROCEDURE — 99212 OFFICE O/P EST SF 10 MIN: CPT | Performed by: SURGERY

## 2024-11-11 PROCEDURE — 73080 X-RAY EXAM OF ELBOW: CPT

## 2024-11-11 NOTE — PROGRESS NOTES
ASSESSMENT/PLAN:      69-year-old female here for right nondisplaced radial neck fracture, DOI 9/14/2024.    New x-rays of the right elbow were obtained and reviewed today demonstrating continued healing of the radial neck fracture. Ginger demonstrates near full ROM lacking only a few degrees of full extension. She has no pain with motion and has returned to full duty without issues. Advised that her fracture will continue to fill in over the next few months but she may resume all normal activity as tolerated. She may follow up with us as needed for this issue    The patient verbalized understanding of exam findings and treatment plan. We engaged in the shared decision-making process and treatment options were discussed at length with the patient. Surgical and conservative management discussed today along with risks and benefits.    Diagnoses and all orders for this visit:    Closed nondisplaced fracture of neck of right radius with routine healing, subsequent encounter  -     XR elbow 3+ vw right; Future          Follow Up:  Return if symptoms worsen or fail to improve.      To Do Next Visit:  Re-evaluation of current issue    ____________________________________________________________________________________________________________________________________________      CHIEF COMPLAINT:  Chief Complaint   Patient presents with    Follow-up     Patient is doing well back to work no complaints        SUBJECTIVE:  Akua Winn is a 69 y.o. year old RHD female who presents today for follow up evaluation of a right radial head/neck fracture, DOI 9/14/2024. Overall Ginger is doing very well. She denies any pain in the elbow and has return to work and normal activity without issue. No paresthesias or other complaints.       I have personally reviewed all the relevant PMH, PSH, SH, FH, Medications and allergies.     PAST MEDICAL HISTORY:  Past Medical History:   Diagnosis Date    Basal cell carcinoma     Right lowere  ashulid       PAST SURGICAL HISTORY:  Past Surgical History:   Procedure Laterality Date    MOHS SURGERY      Right lower eyelid    SKIN BIOPSY         FAMILY HISTORY:  Family History   Problem Relation Age of Onset    Basal cell carcinoma Mother     Basal cell carcinoma Father        SOCIAL HISTORY:  Social History     Tobacco Use    Smoking status: Never    Smokeless tobacco: Never   Vaping Use    Vaping status: Never Used   Substance Use Topics    Alcohol use: Yes     Comment: occasionally    Drug use: Never       MEDICATIONS:    Current Outpatient Medications:     amiodarone 200 mg tablet, Take 1 tablet by mouth daily, Disp: , Rfl:     amoxicillin (AMOXIL) 500 mg capsule, TAKE 4 CAPSULES (2000MG) ONCE, ONE HOUR PRIOR TO DENTAL PROCEDURES, Disp: , Rfl:     fluorouracil (EFUDEX) 5 % cream, Apply topically 2 (two) times a day to the lower lip and right upper lip area for 2 weeks. To chest apply twice a day for up to 3 weeks., Disp: 40 g, Rfl: 0    Mavacamten (Camzyos) 5 MG CAPS, Take 5 mg by mouth daily, Disp: , Rfl:     metoprolol succinate (TOPROL-XL) 25 mg 24 hr tablet, Take 12.5 mg by mouth, Disp: , Rfl:     metoprolol tartrate (LOPRESSOR) 25 mg tablet, Take 25 mg by mouth 2 (two) times a day, Disp: , Rfl:     pravastatin (PRAVACHOL) 20 mg tablet, Take 20 mg by mouth, Disp: , Rfl:     apixaban (Eliquis) 5 mg, TAKE 1 TABLET (5 MG TOTAL) BY MOUTH 2 TIMES A DAY TWICE DAILY, Disp: , Rfl:     ALLERGIES:  No Known Allergies    REVIEW OF SYSTEMS:  Review of Systems   Constitutional:  Negative for chills, fatigue and fever.   HENT:  Negative for hearing loss, nosebleeds and sore throat.    Eyes:  Negative for redness and visual disturbance.   Respiratory:  Negative for cough, shortness of breath and wheezing.    Cardiovascular:  Negative for chest pain, palpitations and leg swelling.   Gastrointestinal:  Negative for abdominal pain, nausea and vomiting.   Endocrine: Negative for polydipsia and polyuria.   Genitourinary:   Negative for difficulty urinating, dysuria and hematuria.   Musculoskeletal:  Negative for arthralgias, joint swelling and myalgias.   Skin:  Negative for rash and wound.   Neurological:  Negative for speech difficulty, weakness, numbness and headaches.   Psychiatric/Behavioral:  Negative for decreased concentration and suicidal ideas. The patient is not nervous/anxious.        VITALS:  There were no vitals filed for this visit.        _____________________________________________________  PHYSICAL EXAMINATION:  General: well developed and well nourished, alert, oriented times 3, and appears comfortable  Psychiatric: Normal  HEENT: Normocephalic, Atraumatic Trachea Midline, No torticollis  Pulmonary: No audible wheezing or respiratory distress   Cardiovascular: No pitting edema, 2+ radial pulse   Abdominal/GI: abdomen non tender, non distended   Skin: No masses, erythema, lacerations, fluctation, ulcerations  Neurovascular: Sensation Intact to the Median, Ulnar, Radial Nerve, Motor Intact to the Median, Ulnar, Radial Nerve, and Pulses Intact  Musculoskeletal: Normal, except as noted in detailed exam and in HPI.      MUSCULOSKELETAL EXAMINATION:  Right elbow:     Lacking 3-5 degrees of full extension  Full Flexion  Full pronation and supination at the end range  No tenderness at the radial head/neck  Full composite fist  Opposition intact  Sensation intact to light touch distally  Brisk capillary refill     ___________________________________________________    STUDIES REVIEWED:  I have personally reviewed and interpreted  AP lateral and oblique radiographs of xrays of right elbow 3 views which demonstrate stable alignment of radial neck fracture with interval healing noted      LABS REVIEWED:    HgA1c:   Lab Results   Component Value Date    HGBA1C 5.2 08/12/2017     BMP:   Lab Results   Component Value Date    CALCIUM 9.5 02/22/2024    K 4.2 02/22/2024    CO2 27 02/22/2024     02/22/2024    BUN 16 02/22/2024     CREATININE 0.78 02/22/2024             PROCEDURES PERFORMED:  Procedures  No Procedures performed today    _____________________________________________________    Alka Navarro

## 2025-04-21 DIAGNOSIS — L56.8 ACTINIC CHEILITIS: ICD-10-CM

## 2025-04-23 RX ORDER — FLUOROURACIL 50 MG/G
CREAM TOPICAL
Qty: 40 G | Refills: 0 | OUTPATIENT
Start: 2025-04-23

## 2025-04-23 NOTE — TELEPHONE ENCOUNTER
Pt will an appt to recheck please refuse medication     ng specific plan:  · Start topical Fluorouracil cream twice a day for 2 weeks to the lower lip and pigmented right upper lip area.  To chest apply twice a day for 3 weeks.  · Apply vaseline to lips prior to using efudex to avoid ingestion of topical medication  · If scaling of lower lip does not resolve or pigmented actinic keratosis above right upper lip does not resolve within 4-6 weeks of efudex therapy please contact office to be re-evaluated and would consider biopsy.   · Redness and irritation are to be expected during the course of treatment and while lesions are healing. Patient aware of expectations.  · -Patient plans to start cream in September and understands risk vs benefit of treating now vs in September

## (undated) DEVICE — BLADE SAGITTAL 2108 FLARED 31X0.64X63MM

## (undated) DEVICE — PACK TOTAL HIP

## (undated) DEVICE — SUTURE POLYSORB 1 UNDYED 1X30 GS-12

## (undated) DEVICE — SYSTEM LABELING CORRECT MEDICATION

## (undated) DEVICE — SOLUTION PROV-IODINE .75 OZ

## (undated) DEVICE — CONTAINER SPECIMEN 4OZ

## (undated) DEVICE — SOLN IRRIG .9%SOD 1000ML

## (undated) DEVICE — GLOVES PI ORTHO SZ 9 LF PF

## (undated) DEVICE — STRAP POSITIONING 5X72" ONE PIECE DISP

## (undated) DEVICE — SUTURE QUILL PDO 0 RX-2068Q 1/2 CIRCLE 36MM REVERSE CUTTER 4

## (undated) DEVICE — NEEDLE HYPODERMIC PRO 18G X 1 1/2IN

## (undated) DEVICE — DRESSING AQUACEL AG 12 INCH

## (undated) DEVICE — BLADE SCALPEL #20

## (undated) DEVICE — ADHESIVE SKIN DERMABOND ADVANCED 0.7ML

## (undated) DEVICE — Device

## (undated) DEVICE — GOWN SIRUS NONRNF RAGLAN XL ST 30/CS

## (undated) DEVICE — TIP BOVIE EXTENSION REUSABLE 4IN

## (undated) DEVICE — HANDLE LIGHT COVER STERILE

## (undated) DEVICE — SHEET DRAPE 40X58 STERILE

## (undated) DEVICE — DRAPE ANTERIOR HIP R

## (undated) DEVICE — GLOVE PROTEXIS PI ORTHO 9.0

## (undated) DEVICE — STOCKINETTE DBL PLY STERILE 8X60

## (undated) DEVICE — TOWEL SURGICAL W17XL27IN BLUE COTTON STANDARD PREWASHED DELI

## (undated) DEVICE — HANDPIECE SUCTION INTERPULSE W/BONE CLEANING TIP

## (undated) DEVICE — SYRINGE DISP LUER-LOK 30 CC

## (undated) DEVICE — MANIFOLD FOUR PORT NEPTUNE

## (undated) DEVICE — CLOTH PREPPING SAGE 2% CHG 2/PK

## (undated) DEVICE — SOLN IV 0.9% NSS 1000ML

## (undated) DEVICE — SOLN DURAPREP WAND

## (undated) DEVICE — SUTURE QUILL PDO 2 RX-1066Q

## (undated) DEVICE — PENEVAC1 NONSTICK SMOKE EVAC